# Patient Record
Sex: FEMALE | Race: BLACK OR AFRICAN AMERICAN
[De-identification: names, ages, dates, MRNs, and addresses within clinical notes are randomized per-mention and may not be internally consistent; named-entity substitution may affect disease eponyms.]

---

## 2017-07-28 ENCOUNTER — HOSPITAL ENCOUNTER (OUTPATIENT)
Dept: HOSPITAL 17 - NEPE | Age: 62
Setting detail: OBSERVATION
LOS: 1 days | Discharge: HOME | End: 2017-07-29
Attending: HOSPITALIST | Admitting: HOSPITALIST
Payer: MEDICAID

## 2017-07-28 VITALS
RESPIRATION RATE: 17 BRPM | OXYGEN SATURATION: 99 % | HEART RATE: 81 BPM | DIASTOLIC BLOOD PRESSURE: 75 MMHG | SYSTOLIC BLOOD PRESSURE: 123 MMHG

## 2017-07-28 VITALS
TEMPERATURE: 97.4 F | RESPIRATION RATE: 16 BRPM | DIASTOLIC BLOOD PRESSURE: 72 MMHG | OXYGEN SATURATION: 100 % | HEART RATE: 84 BPM | SYSTOLIC BLOOD PRESSURE: 141 MMHG

## 2017-07-28 VITALS
DIASTOLIC BLOOD PRESSURE: 70 MMHG | RESPIRATION RATE: 16 BRPM | HEART RATE: 79 BPM | OXYGEN SATURATION: 99 % | SYSTOLIC BLOOD PRESSURE: 112 MMHG

## 2017-07-28 VITALS
DIASTOLIC BLOOD PRESSURE: 80 MMHG | SYSTOLIC BLOOD PRESSURE: 136 MMHG | TEMPERATURE: 98.5 F | OXYGEN SATURATION: 100 % | HEART RATE: 88 BPM | RESPIRATION RATE: 22 BRPM

## 2017-07-28 VITALS
HEART RATE: 78 BPM | OXYGEN SATURATION: 100 % | DIASTOLIC BLOOD PRESSURE: 68 MMHG | SYSTOLIC BLOOD PRESSURE: 117 MMHG | TEMPERATURE: 97.8 F | RESPIRATION RATE: 18 BRPM

## 2017-07-28 VITALS — DIASTOLIC BLOOD PRESSURE: 70 MMHG | SYSTOLIC BLOOD PRESSURE: 112 MMHG

## 2017-07-28 VITALS — SYSTOLIC BLOOD PRESSURE: 123 MMHG | HEART RATE: 83 BPM | DIASTOLIC BLOOD PRESSURE: 75 MMHG

## 2017-07-28 VITALS
HEART RATE: 77 BPM | TEMPERATURE: 97.8 F | SYSTOLIC BLOOD PRESSURE: 128 MMHG | OXYGEN SATURATION: 97 % | RESPIRATION RATE: 18 BRPM | DIASTOLIC BLOOD PRESSURE: 77 MMHG

## 2017-07-28 VITALS — WEIGHT: 132.28 LBS | HEIGHT: 67 IN | BODY MASS INDEX: 20.76 KG/M2

## 2017-07-28 DIAGNOSIS — Z87.891: ICD-10-CM

## 2017-07-28 DIAGNOSIS — C88.4: Primary | ICD-10-CM

## 2017-07-28 DIAGNOSIS — B96.81: ICD-10-CM

## 2017-07-28 DIAGNOSIS — M06.9: ICD-10-CM

## 2017-07-28 DIAGNOSIS — E03.9: ICD-10-CM

## 2017-07-28 LAB
ALP SERPL-CCNC: 102 U/L (ref 45–117)
ALT SERPL-CCNC: 22 U/L (ref 10–53)
ANION GAP SERPL CALC-SCNC: 7 MEQ/L (ref 5–15)
APTT BLD: 30.2 SEC (ref 24.3–30.1)
AST SERPL-CCNC: 16 U/L (ref 15–37)
BASOPHILS # BLD AUTO: 0.1 TH/MM3 (ref 0–0.2)
BASOPHILS NFR BLD: 0.9 % (ref 0–2)
BILIRUB SERPL-MCNC: 0.4 MG/DL (ref 0.2–1)
BUN SERPL-MCNC: 15 MG/DL (ref 7–18)
CHLORIDE SERPL-SCNC: 108 MEQ/L (ref 98–107)
COLOR UR: YELLOW
COMMENT (UR): (no result)
CULTURE IF INDICATED: (no result)
EOSINOPHIL # BLD: 0.3 TH/MM3 (ref 0–0.4)
EOSINOPHIL NFR BLD: 5.8 % (ref 0–4)
ERYTHROCYTE [DISTWIDTH] IN BLOOD BY AUTOMATED COUNT: 12.9 % (ref 11.6–17.2)
GFR SERPLBLD BASED ON 1.73 SQ M-ARVRAT: 97 ML/MIN (ref 89–?)
GLUCOSE UR STRIP-MCNC: (no result) MG/DL
HCO3 BLD-SCNC: 25.7 MEQ/L (ref 21–32)
HCT VFR BLD CALC: 36.3 % (ref 35–46)
HEMO FLAGS: (no result)
HGB UR QL STRIP: (no result)
INR PPP: 0.9 RATIO
KETONES UR STRIP-MCNC: (no result) MG/DL
LYMPHOCYTES # BLD AUTO: 2.3 TH/MM3 (ref 1–4.8)
LYMPHOCYTES NFR BLD AUTO: 37.4 % (ref 9–44)
MCH RBC QN AUTO: 29.1 PG (ref 27–34)
MCHC RBC AUTO-ENTMCNC: 33.6 % (ref 32–36)
MCV RBC AUTO: 86.9 FL (ref 80–100)
MONOCYTES NFR BLD: 7 % (ref 0–8)
MUCOUS THREADS #/AREA URNS LPF: (no result) /LPF
NEUTROPHILS # BLD AUTO: 3 TH/MM3 (ref 1.8–7.7)
NEUTROPHILS NFR BLD AUTO: 48.9 % (ref 16–70)
NITRITE UR QL STRIP: (no result)
PLATELET # BLD: 248 TH/MM3 (ref 150–450)
POTASSIUM SERPL-SCNC: 3.8 MEQ/L (ref 3.5–5.1)
PROTHROMBIN TIME: 10.1 SEC (ref 9.8–11.6)
RBC # BLD AUTO: 4.18 MIL/MM3 (ref 4–5.3)
SODIUM SERPL-SCNC: 141 MEQ/L (ref 136–145)
SP GR UR STRIP: 1.01 (ref 1–1.03)
WBC # BLD AUTO: 6.1 TH/MM3 (ref 4–11)

## 2017-07-28 PROCEDURE — 96374 THER/PROPH/DIAG INJ IV PUSH: CPT

## 2017-07-28 PROCEDURE — 85610 PROTHROMBIN TIME: CPT

## 2017-07-28 PROCEDURE — C9113 INJ PANTOPRAZOLE SODIUM, VIA: HCPCS

## 2017-07-28 PROCEDURE — G0378 HOSPITAL OBSERVATION PER HR: HCPCS

## 2017-07-28 PROCEDURE — 93005 ELECTROCARDIOGRAM TRACING: CPT

## 2017-07-28 PROCEDURE — 83690 ASSAY OF LIPASE: CPT

## 2017-07-28 PROCEDURE — 80053 COMPREHEN METABOLIC PANEL: CPT

## 2017-07-28 PROCEDURE — 81001 URINALYSIS AUTO W/SCOPE: CPT

## 2017-07-28 PROCEDURE — 71010: CPT

## 2017-07-28 PROCEDURE — 85730 THROMBOPLASTIN TIME PARTIAL: CPT

## 2017-07-28 PROCEDURE — 85025 COMPLETE CBC W/AUTO DIFF WBC: CPT

## 2017-07-28 PROCEDURE — 99285 EMERGENCY DEPT VISIT HI MDM: CPT

## 2017-07-28 RX ADMIN — Medication SCH ML: at 21:14

## 2017-07-28 NOTE — HHI.HP
__________________________________________________





HPI


Service


[facility]


Primary Care Physician


No Primary Care Physician


Admission Diagnosis


MALT Lymphoma


Diagnoses:  


Chief Complaint:  


Abdominal Pain


Travel History


International Travel<30 Days:  No


Contact w/Intl Traveler <30 Da:  No


Traveled to Known Affected Are:  No


History of Present Illness


Written by Kimberley Schultz, acting as scribe for Dr. Maguire on 17 at 18:

24. 








Patient is a 61-year-old AA female with primary medical history of 

hypothyroidism, rheumatoid arthritis who came into the hospital with complaints 

of abdominal pain.  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 

lymphoma, stomach.  She had an EGD with Botox and biopsy done by Dr. Moore and 

was told to come to the hospital for further evaluation and treatment of MALT 

lymphoma.  Two weeks ago she was at [facility] diagnosed with H. Pylori.

  Came in today for increasing abdominal pain, reports abdominal pain is 

intermittent and has increased in frequency and severity, radiating to the mid 

sternum area, relieved with pain medications.  Patient reports constipation, 

nausea but no vomiting.  She also reports night sweats.  States that she has 

been feeling some stomach pain on and off and has been having stomach problems 

thinking it was only gas for the past several months.  Currently she denies 

fevers, shortness of breath, chest pain, palpitations, dizziness, headache, 

diarrhea, dysuria.





Review of Systems


Except as stated in HPI:  all other systems reviewed are Neg





Past Family Social History


Past Medical History


Hypothyroidism


Rheumatoid arthritis


Past Surgical History


Partial thyroidectomy


Spinal tap


Left oophorectomy


Reported Medications





Reported Meds & Active Scripts


Active


Reported


Levothyroxine (Levothyroxine Sodium) 25 Mcg Tab 25 Mcg PO DAILY


Allergies:  


Coded Allergies:  


     Aspirin (Verified  Allergy, Severe, NAUSEA, 17)


     Voltaren (Verified  Allergy, Severe, VOMITING, 17)


     Penicillin (Verified  Adverse Reaction, Severe, VOMITING, 17)


Active Ordered Medications





 Current Medications








 Medications


  (Trade)  Dose


 Ordered  Sig/Deepika


 Route  Start Time


 Stop Time Status Last Admin


 


  (NS Flush)  2 ml  UNSCH  PRN


 IV FLUSH  17 15:30


    17 16:07


 


 


  (NS Flush)  2 ml  UNSCH  PRN


 IV FLUSH  17 17:45


     


 


 


  (NS Flush)  2 ml  BID


 IV FLUSH  17 21:00


     


 








Family History


Grandmother  of colon cancer, one brother had leukemia, sister had  

from heart attack, another brother has stomach and colon problem is still alive


Social History


Rare alcohol use not in the last 3 months


Former smoker, quit 10 years ago


History of illicit drug use but not IV drugs in her 30s, she has been clean for 

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





Physical Exam


Vital Signs





 Vital Signs








  Date Time  Temp Pulse Resp B/P Pulse Ox O2 Delivery O2 Flow Rate FiO2


 


17 18:15    112/70 98   


 


17 17:34  79 16 112/70 99 Room Air  


 


17 17:15  83  123/75    


 


17 15:54  81 17 123/75 99 Room Air  


 


17 12:48 98.5 88 22 136/80 100 Room Air  








Physical Exam


GENERAL: This is a well-nourished, well-developed patient, in no apparent 

distress.


SKIN: No rashes, ecchymoses or lesions. Warm and dry.


HEAD: Normocephalic. No temporal or scalp tenderness.


EYES: Pupils equal round and reactive. Extraocular motions intact. No scleral 

icterus. No injection or drainage. 


ENT: Nose without bleeding. Throat without erythema. Uvula midline.  Dentures 

present.  Airway patent.


NECK: Trachea midline. No JVD or lymphadenopathy. Supple, nontender, no 

meningeal signs.


CARDIOVASCULAR: Regular rate and rhythm without murmurs, gallops, or rubs. 


RESPIRATORY: Clear to auscultation. Breath sounds equal bilaterally. No wheezes

, rales, or rhonchi.  


GASTROINTESTINAL: Abdomen soft, non-tender, slightly distended. BS Active x 4


MUSCULOSKELETAL: Extremities without clubbing, cyanosis, or edema. 


NEUROLOGICAL: Awake and alert. Cranial nerves II through XII intact.  Motor and 

sensory grossly within normal limits. Normal speech.


Laboratory





Laboratory Tests








Test 17





 15:40


 


White Blood Count 6.1 


 


Red Blood Count 4.18 


 


Hemoglobin 12.2 


 


Hematocrit 36.3 


 


Mean Corpuscular Volume 86.9 


 


Mean Corpuscular Hemoglobin 29.1 


 


Mean Corpuscular Hemoglobin 33.6 





Concent 


 


Red Cell Distribution Width 12.9 


 


Platelet Count 248 


 


Mean Platelet Volume 8.2 


 


Neutrophils (%) (Auto) 48.9 


 


Lymphocytes (%) (Auto) 37.4 


 


Monocytes (%) (Auto) 7.0 


 


Eosinophils (%) (Auto) 5.8 


 


Basophils (%) (Auto) 0.9 


 


Neutrophils # (Auto) 3.0 


 


Lymphocytes # (Auto) 2.3 


 


Monocytes # (Auto) 0.4 


 


Eosinophils # (Auto) 0.3 


 


Basophils # (Auto) 0.1 


 


CBC Comment DIFF FINAL 


 


Differential Comment  


 


Prothrombin Time 10.1 


 


Prothromb Time International 0.9 





Ratio 


 


Activated Partial 30.2 





Thromboplast Time 


 


Urine Color YELLOW 


 


Urine Turbidity CLEAR 


 


Urine pH 5.5 


 


Urine Specific Gravity 1.015 


 


Urine Protein NEG 


 


Urine Glucose (UA) NEG 


 


Urine Ketones NEG 


 


Urine Occult Blood NEG 


 


Urine Nitrite NEG 


 


Urine Bilirubin NEG 


 


Urine Urobilinogen LESS THAN 2.0 


 


Urine Leukocyte Esterase NEG 


 


Urine WBC 1 


 


Urine Mucus FEW 


 


Microscopic Urinalysis Comment CULT NOT





 INDICATED


 


Sodium Level 141 


 


Potassium Level 3.8 


 


Chloride Level 108 


 


Carbon Dioxide Level 25.7 


 


Anion Gap 7 


 


Blood Urea Nitrogen 15 


 


Creatinine 0.74 


 


Estimat Glomerular Filtration 97 





Rate 


 


Random Glucose 95 


 


Calcium Level 9.2 


 


Total Bilirubin 0.4 


 


Aspartate Amino Transf 16 





(AST/SGOT) 


 


Alanine Aminotransferase 22 





(ALT/SGPT) 


 


Alkaline Phosphatase 102 


 


Total Protein 7.2 


 


Albumin 3.4 


 


Lipase 170 








Result Diagram:  


17 1540                                                                   

             17 1540








Assessment and Plan


Problem List:  


(1) MALT lymphoma


ICD Code:  C88.4


Status:  Acute


(2) Hypothyroidism


ICD Code:  E03.9


Status:  Chronic


Assessment and Plan


Patient is a 61-year-old AA female with primary medical history of 

hypothyroidism, rheumatoid arthritis who came into the hospital with complaints 

of abdominal pain.  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 

lymphoma, stomach.  





MALT lymphoma, gastric


Abdominal pain, associated with nausea


   - Dx with H. Pylori 2 weeks ago.  Had EGD with botox and biopsy done by Dr. Moore.


   - Medical Oncology consult for further recommendations


   - GI consult for follow up.  She was seen by Dr. Moore previously.


   - Zofran PRN for nausea


   - Protonix





Hypothyroidism


   - Continue with home medication levothyroxine 25 g daily





 Labs reviewed UA negative, slightly elevated eosinophil 5.8, slightly elevated 

chloride 108, APTT 30.2, otherwise the rest of the lab readings are within 

normal





DVT prop SCD


Code Status


Full code


Discussed Condition With


Patient, nursing, ED Attending





Attending Statement


This note was transcribed by tip Schultz.  I, Dr. Everett Maguire personally performed the history, physical exam, and medical decision 

making; and confirmed the accuracy of the information in the transcribed note.





Authenticated by Dr. Everett Maguire on 17 at 19:02.








Kimberley Regan 2017 18:40


Everett Maguire MD 2017 19:03

## 2017-07-28 NOTE — RADRPT
EXAM DATE/TIME:  07/28/2017 15:58 

 

[facility] COMPARISON:     

No previous studies available for comparison.

 

                     

INDICATIONS :     

Upper abdominal/chest pain. 

                     

 

MEDICAL HISTORY :     

None.          

 

SURGICAL HISTORY :     

None.   

 

ENCOUNTER:     

Initial                                        

 

ACUITY:     

2 weeks      

 

PAIN SCORE:     

6/10

LOCATION:      chest 

 

FINDINGS:     

The lungs are clear without infiltrate, nodule, or mass.  There is no appreciable pleural effusion fo
r technique.  Heart and mediastinum are unremarkable.

 

CONCLUSION:         No acute cardiopulmonary disease.

 

 

 STUART Vieyra MD on July 28, 2017 at 16:31           

Board Certified Radiologist.

 This report was verified electronically.

## 2017-07-28 NOTE — PD
HPI


Chief Complaint:  Abdominal Pain


Time Seen by Provider:  15:05


Travel History


International Travel<30 days:  No


Contact w/Intl Traveler<30days:  No


Traveled to known affect area:  No





History of Present Illness


HPI


61-year-old Afro-American female presents the emergency department reportedly 

at the request of Dr. Moore the gastroenterologist for recent diagnosis of 

stomach/esophageal cancer.  Patient states she recently had an endoscopy for 

ongoing chest discomfort and abdominal pain.  She states she was called by Dr. Moore's office to come in the hospital for further evaluation and treatment for 

new diagnosis of MALT lymphoma.  Patient currently has minimal pain but has a 

sickly's stomach feeling at this time.  No vomiting currently.  She has no 

diarrhea, no constipation.  She is allergic to aspirin, penicillin, and 

Voltaren.





PFSH


Past Medical History


Arthritis:  Yes


Asthma:  No


Autoimmune Disease:  No


Blood Disorders:  No


Anxiety:  No


Depression:  No


Heart Rhythm Problems:  No


Cancer:  No


Cardiovascular Problems:  Yes


High Cholesterol:  No


Chemotherapy:  No


Chest Pain:  Yes


Congestive Heart Failure:  No


COPD:  No


Cerebrovascular Accident:  No


Diabetes:  No


Diminished Hearing:  No


Endocrine:  No


Gastrointestinal Disorders:  No


GERD:  No


Glaucoma:  No


Genitourinary:  No


Headaches:  No


Hepatitis:  No


Hiatal Hernia:  No


Hypertension:  No


Immune Disorder:  No


Kidney Stones:  No


Musculoskeletal:  Yes


Neurologic:  No


Psychiatric:  No


Respiratory:  No


Myocardial Infarction:  No


Radiation Therapy:  No


Renal Failure:  No


Seizures:  No


Sickle Cell Disease:  No


Sleep Apnea:  No


Thyroid Disease:  No


Ulcer:  No


Menopausal:  Yes





Past Surgical History


Abdominal Surgery:  No


AICD:  No


Cardiac Surgery:  No


Ear Surgery:  No


Endocrine Surgery:  No


Eye Surgery:  No


Genitourinary Surgery:  No


Gynecologic Surgery:  Yes (LEFT OOPHRECTOMY)


Hysterectomy:  Yes (STATES ONE OVARY REMOVED ONLY)


Joint Replacement:  No


Neurologic Surgery:  No


Oral Surgery:  No


Pacemaker:  No


Thoracic Surgery:  No


Other Surgery:  Yes





Social History


Alcohol Use:  No


Tobacco Use:  No


Substance Use:  No





Allergies-Medications


(Allergen,Severity, Reaction):  


Coded Allergies:  


     Aspirin (Verified  Allergy, Severe, NAUSEA, 7/28/17)


     Voltaren (Verified  Allergy, Severe, VOMITING, 7/28/17)


     Penicillin (Verified  Adverse Reaction, Severe, VOMITING, 7/28/17)


Reported Meds & Prescriptions





Reported Meds & Active Scripts


Active


Reported


Levothyroxine (Levothyroxine Sodium) 25 Mcg Tab 25 Mcg PO DAILY








Review of Systems


General / Constitutional:  No: Fever


Eyes:  No: Visual changes


HENT:  No: Headaches


Cardiovascular:  No: Chest Pain or Discomfort


Respiratory:  No: Shortness of Breath


Gastrointestinal:  Positive: Nausea,  No: Vomiting, Diarrhea, Abdominal Pain


Genitourinary:  No: Dysuria


Musculoskeletal:  No: Pain


Skin:  No Rash


Neurologic:  No: Weakness


Psychiatric:  No: Depression


Endocrine:  No: Polydipsia


Hematologic/Lymphatic:  No: Easy Bruising





Physical Exam


Narrative


GENERAL: Patient appears in no acute distress.  


SKIN: Warm and dry.  Normal color.  Normal turgor.


HEAD: Atraumatic. Normocephalic. 


EYES: Pupils equal and round. No scleral icterus. No injection or drainage. 


ENT: No nasal bleeding or discharge.  Mucous membranes pink and moist.  Pharynx 

is clear.  Airway is patent.


NECK: Trachea midline. No JVD. 


CARDIOVASCULAR: Regular rate and rhythm.  


RESPIRATORY: No accessory muscle use. Clear to auscultation. Breath sounds 

equal bilaterally. 


GASTROINTESTINAL: Abdomen soft, non-tender, nondistended. Hepatic and splenic 

margins not palpable. 


MUSCULOSKELETAL: Extremities without clubbing, cyanosis, or edema. No obvious 

deformities. 


NEUROLOGICAL: Awake and alert. No obvious cranial nerve deficits.  Motor 

grossly within normal limits. Five out of 5 muscle strength in the arms and 

legs.  Normal speech.


PSYCHIATRIC: Appropriate mood and affect; insight and judgment normal.





Data


Data


Last Documented VS





Vital Signs








  Date Time  Temp Pulse Resp B/P Pulse Ox O2 Delivery O2 Flow Rate FiO2


 


7/28/17 15:54  81 17 123/75 99 Room Air  


 


7/28/17 12:48 98.5       








Orders





 Complete Blood Count With Diff (7/28/17 15:25)


Comprehensive Metabolic Panel (7/28/17 15:25)


Lipase (7/28/17 15:25)


Prothrombin Time / Inr (Pt) (7/28/17 15:25)


Act Partial Throm Time (Ptt) (7/28/17 15:25)


Urinalysis - C+S If Indicated (7/28/17 15:25)


Iv Access Insert/Monitor (7/28/17 15:25)


Ecg Monitoring (7/28/17 15:25)


Oximetry (7/28/17 15:25)


Ondansetron Inj (Zofran Inj) (7/28/17 15:30)


Sodium Chloride 0.9% Flush (Ns Flush) (7/28/17 15:30)


Electrocardiogram (7/28/17 15:25)


Chest, Single Ap (7/28/17 15:25)





Labs





 Laboratory Tests








Test 7/28/17





 15:40


 


White Blood Count 6.1 TH/MM3


 


Red Blood Count 4.18 MIL/MM3


 


Hemoglobin 12.2 GM/DL


 


Hematocrit 36.3 %


 


Mean Corpuscular Volume 86.9 FL


 


Mean Corpuscular Hemoglobin 29.1 PG


 


Mean Corpuscular Hemoglobin 33.6 %





Concent 


 


Red Cell Distribution Width 12.9 %


 


Platelet Count 248 TH/MM3


 


Mean Platelet Volume 8.2 FL


 


Neutrophils (%) (Auto) 48.9 %


 


Lymphocytes (%) (Auto) 37.4 %


 


Monocytes (%) (Auto) 7.0 %


 


Eosinophils (%) (Auto) 5.8 %


 


Basophils (%) (Auto) 0.9 %


 


Neutrophils # (Auto) 3.0 TH/MM3


 


Lymphocytes # (Auto) 2.3 TH/MM3


 


Monocytes # (Auto) 0.4 TH/MM3


 


Eosinophils # (Auto) 0.3 TH/MM3


 


Basophils # (Auto) 0.1 TH/MM3


 


CBC Comment DIFF FINAL 


 


Differential Comment  


 


Prothrombin Time 10.1 SEC


 


Prothromb Time International 0.9 RATIO





Ratio 


 


Activated Partial 30.2 SEC





Thromboplast Time 


 


Urine Color YELLOW 


 


Urine Turbidity CLEAR 


 


Urine pH 5.5 


 


Urine Specific Gravity 1.015 


 


Urine Protein NEG mg/dL


 


Urine Glucose (UA) NEG mg/dL


 


Urine Ketones NEG mg/dL


 


Urine Occult Blood NEG 


 


Urine Nitrite NEG 


 


Urine Bilirubin NEG 


 


Urine Urobilinogen LESS THAN 2.0





 MG/DL


 


Urine Leukocyte Esterase NEG 


 


Urine WBC 1 /hpf


 


Urine Mucus FEW /lpf


 


Microscopic Urinalysis Comment CULT NOT





 INDICATED


 


Sodium Level 141 MEQ/L


 


Potassium Level 3.8 MEQ/L


 


Chloride Level 108 MEQ/L


 


Carbon Dioxide Level 25.7 MEQ/L


 


Anion Gap 7 MEQ/L


 


Blood Urea Nitrogen 15 MG/DL


 


Creatinine 0.74 MG/DL


 


Estimat Glomerular Filtration 97 ML/MIN





Rate 


 


Random Glucose 95 MG/DL


 


Calcium Level 9.2 MG/DL


 


Total Bilirubin 0.4 MG/DL


 


Aspartate Amino Transf 16 U/L





(AST/SGOT) 


 


Alanine Aminotransferase 22 U/L





(ALT/SGPT) 


 


Alkaline Phosphatase 102 U/L


 


Total Protein 7.2 GM/DL


 


Albumin 3.4 GM/DL


 


Lipase 170 U/L











[facility]


Medical Decision Making


Medical Screen Exam Complete:  Yes


Emergency Medical Condition:  Yes


Medical Record Reviewed:  Yes


Differential Diagnosis


Recent diagnosis of abdominal cancer.  H. pylori.  Abdominal pain.


Narrative Course


Call was placed to Dr. Moore, regarding the patient.


IV access is obtained and the patient is given 4 mg Zofran IV.


EKG, chest x-ray, and labs ordered including CBC, CMP, urinalysis, and lipase.


Chest x-ray is negative for acute process.  EKG shows normal sinus rhythm 

without significant findings.


CBC is unremarkable.


Urinalysis is unremarkable.


CMP shows no significant findings.


Records are requested from the [facility].


Patient will be admitted to the hospitalist with oncology consult.





Diagnosis





 Primary Impression:  


 MALT lymphoma





Admitting Information


Admitting Physician Requests:  Observation


Condition:  Stable








Ayo Chavez Jul 28, 2017 15:05

## 2017-07-29 VITALS
TEMPERATURE: 85.5 F | DIASTOLIC BLOOD PRESSURE: 63 MMHG | OXYGEN SATURATION: 98 % | HEART RATE: 89 BPM | SYSTOLIC BLOOD PRESSURE: 128 MMHG

## 2017-07-29 VITALS
OXYGEN SATURATION: 96 % | HEART RATE: 88 BPM | RESPIRATION RATE: 18 BRPM | DIASTOLIC BLOOD PRESSURE: 70 MMHG | TEMPERATURE: 98 F | SYSTOLIC BLOOD PRESSURE: 120 MMHG

## 2017-07-29 VITALS
OXYGEN SATURATION: 99 % | DIASTOLIC BLOOD PRESSURE: 69 MMHG | TEMPERATURE: 96.1 F | HEART RATE: 71 BPM | SYSTOLIC BLOOD PRESSURE: 128 MMHG

## 2017-07-29 VITALS
DIASTOLIC BLOOD PRESSURE: 74 MMHG | TEMPERATURE: 98.4 F | OXYGEN SATURATION: 98 % | SYSTOLIC BLOOD PRESSURE: 129 MMHG | HEART RATE: 80 BPM

## 2017-07-29 RX ADMIN — Medication SCH ML: at 09:00

## 2017-07-29 NOTE — EKG
Date Performed: 2017       Time Performed: 16:15:58

 

PTAGE:      61 years

 

EKG:      Sinus rhythm 

 

 NONSPECIFIC T-WAVE CHANGE ANTERIORLY ABNORMAL ECG Compared to 

 

 PREVIOUS TRACING            , the T-wave changes have improved. PREVIOUS TRACIN2006 07.09

 

DOCTOR:   Chris Magaña  Interpretating Date/Time  2017 19:18:28

## 2017-07-29 NOTE — HHI.DCPOC
Discharge Care Plan


Diagnosis:  


(1) Hypothyroidism


(2) MALT lymphoma


(3) Abdominal pain


(4) H. pylori infection


Goals to Promote Your Health


* To prevent worsening of your condition and complications


* To maintain your health at the optimal level


Directions to Meet Your Goals


*** Take your medications as prescribed


*** Follow your dietary instruction


*** Follow activity as directed








*** Keep your appointments as scheduled


*** Take your immunizations and boosters as scheduled


*** If your symptoms worsen call your PCP, if no PCP go to Urgent Care Center 

or Emergency Room***


*** Smoking is Dangerous to Your Health. Avoid second hand smoke***


***Call the 24-hour hour crisis hotline for domestic abuse at 1-912.690.6104***








Everett Maguire MD Jul 29, 2017 14:57

## 2017-07-29 NOTE — MB
cc:

ROLAN CANTRELL

****

 

 

DATE OF CONSULTATION:  07/29/2017

 

REASON FOR CONSULTATION:

Gastric MALT lymphoma.

 

PATIENT PROFILE:

The patient is a 61-year-old black female.  She has been  twice.  She

has one son.  She was born in [place].  She stopped smoking 10 years ago 
and

had smoked one to two packs of cigarettes per day for 30 years.  She has not

had any alcohol in a least 4 months, and prior to this alcohol intake was very

modest.

 

HISTORY OF PRESENT ILLNESS:

I believe that the current problem dates back to April 30, 2004 when she had an

upper endoscopy.  She had a biopsy of the gastric antrum and was found to have

a lymphoid infiltrate.  A Maryann stain revealed focal clusters of organisms

consistent with Helicobacter. The appearance of the infiltrate would be

consistent with an extranodal marginal zone B-cell MALT lymphoma.

 

Over a number of months, she has had upper abdominal discomfort occasionally 
pain in

the lower chest. Approximately two weeks ago she went to [facility] due to the pain.  She was seen by Dr. Moore, who is a

gastroenterologist.  I have spoken with the nurse practitioner, Isidro Calderon.

 

The patient had a CT scan of the abdomen and pelvis at [facility] and

according to the nurse practitioner this was normal.

She underwent an upper endoscopy performed by Dr. Moore as well as a

colonoscopy on 7/13/2017.  She was found to have duodenitis in the duodenal

bulb.  There are multiple superficial ulcerations and severe gastritis in the

antrum.  There was also a clean based ulcer seen which is suspicious for

malignancy in the lesser curvature.  The EG junction was dilated.  Botox was

injected.  The biopsy revealed Helicobacter pylori. There was moderate to

severe chronic active gastritis.  There was an atypical lymphoid proliferation

suspicious for a MALT lymphoma.

 

She was begun on treatment only one or two days ago.  She came to the hospital

because of upper abdominal pain which has occurred again and is intermittent.

There is no bleeding.  She has not had any peripheral adenopathy.  There has

been no fever, night sweats or chills.

 

PAST SURGICAL HISTORY:

Removal of ovary approximately 35 years ago, which was benign.

 

PAST MEDICAL HISTORY:

Overactive thyroid.

 

MEDICATIONS PRIOR TO ADMISSION:

1. Flexeril.

2. Neurontin.

3. Levothyroxine.

4. Recently started on antibiotics, which she has taken for only one to two

   days.

5. I believe, Flagyl.

6. Tetracycline.

7. Protonix.

 

ALLERGIES:

PENICILLIN CAUSES HER STOMACH TO BE UPSET.

 

FAMILY HISTORY:

Family history is noncontributory.

 

REVIEW OF SYSTEMS:

No change in vision or hearing. Recent discomfort in the lower chest area which

was evaluated at [facility]. Upper abdominal pain. In the past, she has

had joint pain and this has been a very minimal problem.  She states that she

was diagnosed many years ago with rheumatoid arthritis and saw Dr. Loaiza. No

dysuria, frequency, hematuria.  No melena or hematochezia. No skin problems.

No psychiatric problems.

 

PHYSICAL EXAMINATION:

GENERAL:  The physical exam reveals a well-appearing female.

VITAL SIGNS: Blood pressure 120/70, respiratory rate 18, pulse 80 afebrile, 02

saturation 98%.

HEAD, EYES, EARS, NOSE, THROAT:  Head is normocephalic. The sclerae and

conjunctivae are normal. Oropharynx unremarkable.

LYMPHATIC: No cervical, supraclavicular, axillary or inguinal adenopathy.

BREASTS: Without masses.

ABDOMEN: Without hepatosplenomegaly or masses.

EXTREMITIES: Without edema.

MUSCULOSKELETAL: No bone pain.

NEUROLOGIC: No weakness.

 

 

ASSESSMENT:

The patient is a 61-year-old female who has gastritis and an ulcer. The biopsy

is suggestive of a MALT lymphoma. According to the nurse practitioner, she had

a CT scan of the abdomen and pelvis, and was found to have no intraabdominal

adenopathy.



Under these circumstances, it appears that she has early stage Helicobacter

pylori-positive lymphoma.  The initial treatment is eradication of the H.

Pylori.

 

RECOMMENDATIONS:

1. The patient should receive treatment to eradicate the H. Pylori.

2. Histologic complete response is achieved in-between 50% to 83% of patients

   treated in this manner.

3. The median time to eradication to CR is about 15 months. Relapse rates are

   about 20%.

4. She will require follow up with GI, both in terms of demonstrating

   eradication of H. Pylori and the need for periodic upper endoscopy with

   biopsies.

5. If the treatment is unsuccessful, then radiation therapy can be used with

   complete response rates of almost 100% but the initial treatment remains

   eradication of the H. Pylori.

 

This was discussed with the nurse practitioner who will talk with the

gastroenterologist on call.  I spoke with the primary care physician, Dr. Maguire, as well.  Hopefully she will be able to go home on appropriate

antibiotic therapy and follow up with gastroenterology in terms of eradication

of the H. pylori and repeat upper endoscopies.

 

 

                              _________________________________

                              MD GENESIS Acosta/ERIN

D:  7/29/2017/2:55 PM

T:  7/29/2017/4:10 PM

Visit #:  [id number]

Job #:  [id number]

JUSTINA

## 2017-07-29 NOTE — HHI.PR
Subjective


Remarks


Follow up abdominal pain, lymphoma. Patient continues to report abdominal pain, 

worse in right upper quadrant. No nausea/vomiting.





Objective


Vitals





 Vital Signs








  Date Time  Temp Pulse Resp B/P Pulse Ox O2 Delivery O2 Flow Rate FiO2


 


7/29/17 08:48 96.1 71  128/69 99   


 


7/29/17 04:24 98.0 88 18 120/70 96   


 


7/28/17 23:17 97.8 78 18 117/68 100   


 


7/28/17 20:01 97.8 77 18 128/77 97   


 


7/28/17 18:46 97.4 84 16 141/72 100   


 


7/28/17 18:15    112/70 98   


 


7/28/17 17:34  79 16 112/70 99 Room Air  


 


7/28/17 17:15  83  123/75    


 


7/28/17 15:54  81 17 123/75 99 Room Air  


 


7/28/17 12:48 98.5 88 22 136/80 100 Room Air  








Result Diagram:  


7/28/17 1540                                                                   

             7/28/17 1540





Imaging





Last Impressions








Chest X-Ray 7/28/17 1525 Signed





Impressions: 





 Service Date/Time:  Friday, July 28, 2017 15:58 - CONCLUSION: No acute 





 cardiopulmonary disease.    STUART Vieyra MD 








Objective Remarks


General: No acute distress.


Heart: Regular rate and rhythm. No murmur.


Lungs: Clear to auscultation bilaterally. No wheezes, rales, or rhonchi. 

Breathing is nonlabored.


Abdomen: Soft, mild tenderness to palpation in the right upper quadrant, 

nondistended.


Extremities: No lower extremity edema.


Psych: Alert and oriented.


Procedures


None


Urinary Catheter:  No


Vascular Central Line Catheter:  No





A/P


Problem List:  


(1) MALT lymphoma


ICD Code:  C88.4


Status:  Acute


(2) Hypothyroidism


ICD Code:  E03.9


Status:  Chronic


(3) Abdominal pain


ICD Code:  R10.9


Status:  Acute


Assessment and Plan


1. MALT lymphoma, gastric: GI and oncology consultations are pending. Continue 

pain control, antiemetics. Continue Protonix.


2. Hypothyroidism: Continue Synthroid.


3. DVT prophylaxis: SCDs, Lovenox.








Everett Maguire MD Jul 29, 2017 10:35

## 2017-07-29 NOTE — PD.CONS
HPI


History of Present Illness


This is a 61 year old AA female with primary medical history of hypothyroidism, 

rheumatoid arthritis who came into the hospital with complaints of abdominal 

pain. Patient has been having epigastric pain and chest pain for 2 yrs, and 

always was told it was GERD, so she would take PPI, that normally would ease 

the symptoms but doesn't totally give her relief. She lost some wt, but has 

gained back recently. Denies nausea, vomiting, hematemesis, melena or 

hematochezia. She has bloating, and constipation. She was evaluated at  2 

weeks ago and under went  EGD/colonoscopy on ( 17) with Dr. Moore---> 

duodenitis in duodenal bulb, multiple superficial ulcerations and severe 

gastritis  in the antrum.. similar findings in the lesser curvature, with clean 

based ulcer was seen. suspicious for malignancy, esophagitis in the distal 

esophagus, a very tight EG junction with dilated esophagus suggesting 

achalasia. Botox was injected, prior to that dilatation suing Savary dilator  17

, small hiatal hernia  Colonoscopy showed diverticulosis, sigmoid and 

descending colon, internal  and external  hemorrhoids, rectal polyps and 2 

diminutive colon cold bx with compete removal was performed BX revealed H-pylori

, gastric body  bx revealed moderate to severe chronic active gastritis with 

atypical lymphoid proliferation suspicious for MALT lymphoma. CD 20: Dominant 

papulation of B cells with focal involvement  of glandular epithelium, CD5: 

Positive in native T-cells, Bc12: positive in the native T-cells, CD 10: 

negative, CD 23: Negative, hyperplastic polyps in the rectum. Ct on 17 

Punctate nonobstructing stone in the upper pole the right kidney, mild to 

moderate circumferential thickening of the gastroesophageal junction. She was 

abx for H-pylori, started first dose yesterday. She is still with epigastric 

pain and asking about pain meds. She was advised by dr. Moore to come here for 

further eval. Oncology is consulted. 














 (Yumiko Quintana)





PFSH


Past Medical History


Hypothyroidism


Rheumatoid arthritis


MALT lymphoma 


GERD


Past Surgical History


Partial thyroidectomy


Spinal tap


Left oophorectomy


EGD/Botox. colonoscopy  (Yumiko Quintana)


Coded Allergies:  


     Aspirin (Verified  Allergy, Severe, NAUSEA, 17)


     Voltaren (Verified  Allergy, Severe, VOMITING, 17)


     Penicillin (Verified  Adverse Reaction, Severe, VOMITING, 17)


Medications





 Current Medications








 Medications


  (Trade)  Dose


 Ordered  Sig/Deepika


 Route  Start Time


 Stop Time Status Last Admin


 


  (NS Flush)  2 ml  UNSCH  PRN


 IV FLUSH  17 15:30


    17 16:07


 


 


  (NS Flush)  2 ml  UNSCH  PRN


 IV FLUSH  17 17:45


     


 


 


  (NS Flush)  2 ml  BID


 IV FLUSH  17 21:00


    17 09:00


 


 


  (Protonix Inj)  40 mg  Q24H


 IV PUSH  17 21:00


    17 21:14


 


 


  (Lovenox Inj)  40 mg  Q24H


 SQ  17 10:45


     


 


 


  (Norco  5-325 Mg)  1 tab  Q4H  PRN


 PO  17 10:45


     


 


 


  (Norco  7.5-325


 Mg)  1 tab  Q4H  PRN


 PO  17 10:45


     


 


 


  (Zofran Inj)  4 mg  Q8HR  PRN


 IV PUSH  17 10:45


     


 








Family History


Grandmother  of colon cancer, one brother had leukemia, sister had  

from heart attack, another brother has stomach and colon problem is still alive


Social History


Rare alcohol use not in the last 3 months


Former smoker, quit 10 years ago


History of illicit drug use but not IV drugs in her 30s, she has been clean for 

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





Review of Systems


Constitutional:  COMPLAINS OF: Weight gain, Change in appetite


Endocrine:  DENIES: Polyuria


Eyes:  DENIES: Double Vision


Ears, nose, mouth, throat:  DENIES: Hoarseness


Respiratory:  DENIES: Shortness of breath


Cardiovascular:  DENIES: Lower Extremity Edema


Gastrointestinal:  COMPLAINS OF: Abdominal pain, Constipation, Swelling of 

Abdomen, Heartburn,  DENIES: Black stools, Bloody stools, Diarrhea, Nausea, 

Vomiting, Difficulty Swallowing, Anorexia, Odynophagia, Hematemesis


Genitourinary:  DENIES: Hematuria


Musculoskeletal:  DENIES: Neck pain


Integumentary:  DENIES: Jaundice


Hematologic/lymphatic:  DENIES: Bruising


Immunologic/allergic:  DENIES: Eczema


Neurologic:  DENIES: Abnormal gait


Psychiatric:  DENIES: Anxiety (Yumiko Quintana)





GI Exam


Vitals I&O





 Vital Signs








  Date Time  Temp Pulse Resp B/P Pulse Ox O2 Delivery O2 Flow Rate FiO2


 


17 08:48 96.1 71  128/69 99   


 


17 04:24 98.0 88 18 120/70 96   


 


17 23:17 97.8 78 18 117/68 100   


 


17 20:01 97.8 77 18 128/77 97   


 


17 18:46 97.4 84 16 141/72 100   


 


17 18:15    112/70 98   


 


17 17:34  79 16 112/70 99 Room Air  


 


17 17:15  83  123/75    


 


17 15:54  81 17 123/75 99 Room Air  


 


17 12:48 98.5 88 22 136/80 100 Room Air  








Imaging





Last Impressions








Chest X-Ray 17 1525 Signed





Impressions: 





 Service Date/Time:  2017 15:58 - CONCLUSION: No acute 





 cardiopulmonary disease.    STUART Vieyra MD 





 


Ct on 17 Punctate nonobstructing stone in the upper pole the right kidney, 

mild to moderate circumferential thickening of the gastroesophageal junction


Laboratory











Test 17





 15:40


 


White Blood Count 6.1 TH/MM3


 


Red Blood Count 4.18 MIL/MM3


 


Hemoglobin 12.2 GM/DL


 


Hematocrit 36.3 %


 


Mean Corpuscular Volume 86.9 FL


 


Mean Corpuscular Hemoglobin 29.1 PG


 


Mean Corpuscular Hemoglobin 33.6 %





Concent 


 


Red Cell Distribution Width 12.9 %


 


Platelet Count 248 TH/MM3


 


Mean Platelet Volume 8.2 FL


 


Neutrophils (%) (Auto) 48.9 %


 


Lymphocytes (%) (Auto) 37.4 %


 


Monocytes (%) (Auto) 7.0 %


 


Eosinophils (%) (Auto) 5.8 %


 


Basophils (%) (Auto) 0.9 %


 


Neutrophils # (Auto) 3.0 TH/MM3


 


Lymphocytes # (Auto) 2.3 TH/MM3


 


Monocytes # (Auto) 0.4 TH/MM3


 


Eosinophils # (Auto) 0.3 TH/MM3


 


Basophils # (Auto) 0.1 TH/MM3


 


CBC Comment DIFF FINAL 


 


Differential Comment  


 


Prothrombin Time 10.1 SEC


 


Prothromb Time International 0.9 RATIO





Ratio 


 


Activated Partial 30.2 SEC





Thromboplast Time 


 


Urine Color YELLOW 


 


Urine Turbidity CLEAR 


 


Urine pH 5.5 


 


Urine Specific Gravity 1.015 


 


Urine Protein NEG mg/dL


 


Urine Glucose (UA) NEG mg/dL


 


Urine Ketones NEG mg/dL


 


Urine Occult Blood NEG 


 


Urine Nitrite NEG 


 


Urine Bilirubin NEG 


 


Urine Urobilinogen LESS THAN 2.0





 MG/DL


 


Urine Leukocyte Esterase NEG 


 


Urine WBC 1 /hpf


 


Urine Mucus FEW /lpf


 


Microscopic Urinalysis Comment CULT NOT





 INDICATED


 


Sodium Level 141 MEQ/L


 


Potassium Level 3.8 MEQ/L


 


Chloride Level 108 MEQ/L


 


Carbon Dioxide Level 25.7 MEQ/L


 


Anion Gap 7 MEQ/L


 


Blood Urea Nitrogen 15 MG/DL


 


Creatinine 0.74 MG/DL


 


Estimat Glomerular Filtration 97 ML/MIN





Rate 


 


Random Glucose 95 MG/DL


 


Calcium Level 9.2 MG/DL


 


Total Bilirubin 0.4 MG/DL


 


Aspartate Amino Transf 16 U/L





(AST/SGOT) 


 


Alanine Aminotransferase 22 U/L





(ALT/SGPT) 


 


Alkaline Phosphatase 102 U/L


 


Total Protein 7.2 GM/DL


 


Albumin 3.4 GM/DL


 


Lipase 170 U/L








Physical Examination


HEENT: normocephalic; atraumatic; no jaundice.  


NECK: Neck is supple, no JVD, no lymphadenopathy.


CHEST:  Chest is clear to auscultation and percussion.


CARDIAC:  Regular rate and rhythm with no murmur gallop or rubs.


ABDOMEN:  Soft, nondistended, epigastric pain; no hepatosplenomegaly; bowel 

sounds are present in all four quadrants.


EXTREMITIES: No clubbing, cyanosis, or edema.


SKIN:  Normal; no rash; no jaundice.


CNS:  No focal deficits; alert and oriented times three. (Yumiko Quintana)





Assessment and Plan


Plan


- New diagnosis of MALT lymphoma- Patient has been having epigastric pain and 

chest pain for 2 yrs, and always was told it was GERD, so she would take PPI, 

that normally would ease the symptoms but doesn't totally give her relief. She 

lost some wt, but has gained back recently. Denies nausea, vomiting, hematemesis

, melena or hematochezia. She has bloating, and constipation. She was evaluated 

at  2 weeks ago and under went  EGD/colonoscopy on ( 17) with Dr. Moore--

-> duodenitis in duodenal bulb, multiple superficial ulcerations and severe 

gastritis  in the antrum.. similar findings in the lesser curvature, with clean 

based ulcer was seen. suspicious for malignancy, esophagitis in the distal 

esophagus, a very tight EG junction with dilated esophagus suggesting 

achalasia. Botox was injected, prior to that dilatation suing Savary dilator  17

, small hiatal hernia  Colonoscopy showed diverticulosis, sigmoid and 

descending colon, internal  and external  hemorrhoids, rectal polyps and 2 

diminutive colon cold bx with compete removal was performed BX revealed H-pylori

, gastric body  bx revealed moderate to severe chronic active gastritis with 

atypical lymphoid proliferation suspicious for MALT lymphoma. CD 20: Dominant 

papulation of B cells with focal involvement  of glandular epithelium, CD5: 

Positive in native T-cells, Bc12: positive in the native T-cells, CD 10: 

negative, CD 23: Negative, hyperplastic polyps in the rectum. Ct on 17 

Punctate nonobstructing stone in the upper pole the right kidney, mild to 

moderate circumferential thickening of the gastroesophageal junction. She was 

abx for H-pylori, started first dose yesterday. She is still with epigastric 

pain and asking about pain meds. She was advised by dr. Moore to come here for 

further eval. Oncology is consulted. 


- Dysphagia- S/P EGD/dill/botox with improvement 


- H-pyori- will order tetracycline, Flagyl, ppi 


- Hypothyroidism per attending 





Plan:


- THAI


- Await oncology eval 


- Tetracycline 500 mg Q 6 hrs


- Flagyl 500 mg Q 8 hrs. 


- cont. PPI


- Pain meds 


- Case was discussed with Dr. Lucio who thinks this is caused by H-pylori, and 

seems to be present in  on bx


  He strongly believes once H-pylori eradicated, this would be cured and he 

recommends f/u bx after eradication of H-pylori 


- ok to go home and continue tx for H-pylori which she started yesterday, then f

/u with GI in 2 weeks 


- EGD in 2 months with bx for f/u on H-pylori 


- Patient seen and examined by Dr. Montoya and myself and this note is written 

on his behalf  (Yumiko Quintana)


Physician Comments


Patient seen and examined


Agree with above


Continue with current supportive care


Monitor labs


We will proceed with H. pylori eradication


Patient will need to follow-up with GI in 2 weeks


Most likely she will need repeat endoscopy


Oncology evaluation appreciated


Okay for discharge from a GI standpoint (Angel Montoya MD)








Yumiko Quintana 2017 11:38


Angel Montoya MD 2017 17:20

## 2017-08-24 ENCOUNTER — HOSPITAL ENCOUNTER (OUTPATIENT)
Dept: HOSPITAL 17 - HRAD | Age: 62
End: 2017-08-24
Attending: INTERNAL MEDICINE
Payer: COMMERCIAL

## 2017-08-24 DIAGNOSIS — C88.4: Primary | ICD-10-CM

## 2017-08-24 PROCEDURE — 71260 CT THORAX DX C+: CPT

## 2017-08-24 PROCEDURE — 74177 CT ABD & PELVIS W/CONTRAST: CPT

## 2017-08-24 NOTE — RADRPT
EXAM DATE/TIME:  08/24/2017 13:48 

 

[facility] COMPARISON:     

No previous studies available for comparison.

 

 

INDICATIONS :     

Lymphoma; re-staging evaluation.

                      

 

IV CONTRAST:     

96 cc Omnipaque 350 (iohexol) IV ; Cumulative dose for multiple exams.

 

 

ORAL CONTRAST:      

Prescribed oral contrast ingested.

                      

 

RADIATION DOSE:     

5.13 CTDIvol (mGy) ; Combined studies - Thorax/Abdomen/Pelvis

 

 

MEDICAL HISTORY :     

Lymphoma.  

 

SURGICAL HISTORY :       

Left oophorectomy. 

 

ENCOUNTER:      

Initial

 

ACUITY:      

1 day

 

PAIN SCALE:      

0/10

 

LOCATION:        

upper quadrant 

 

TECHNIQUE:     

Volumetric scanning of the abdomen and pelvis was performed.  Using automated exposure control and ad
justment of the mA and/or kV according to patient size, radiation dose was kept as low as reasonably 
achievable to obtain optimal diagnostic quality images.  DICOM format image data is available electro
nically for review and comparison.  

 

FINDINGS:     

CT Abdomen: The spleen, pancreas, right kidney, adrenals are unremarkable. Incidental note is made of
 a couple of tiny subcentimeter cysts in the left kidney. There is a tiny subcentimeter cyst in the l
eft hepatic lobe found incidentally. There is no evidence for any appreciable pathological adenopathy
, free fluid, or bowel obstruction.  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
ilaterally. Chronic vascular calcifications are present involving the aorta, iliac arteries without a
ny significant stenosis or aneurysmal dilatations for technique.

 

 

CT pelvis: There is no evidence for mass, abscess formation, or any significant adenopathy within the
 pelvis.  

 

 

CONCLUSION:     Chronic atherosclerotic calcifications and no evidence for metastatic disease.

 

 

 

 STUART Vieyra MD on August 24, 2017 at 14:37           

Board Certified Radiologist.

 This report was verified electronically.

## 2017-08-24 NOTE — RADRPT
EXAM DATE/TIME:  08/24/2017 13:45 

 

[facility] COMPARISON:     

No previous studies available for comparison.

 

 

INDICATIONS :     

Lymphoma; re-staging evaluation.

                      

 

IV CONTRAST:     

96 cc Omnipaque 350 (iohexol) IV ; Cumulative dose for multiple exams.

 

 

RADIATION DOSE:     

5.13 CTDIvol (mGy) ; Combined studies - Thorax/Abdomen/Pelvis

 

 

MEDICAL HISTORY :     

Lymphoma.  

 

SURGICAL HISTORY :       

Left oophorectomy. 

 

ENCOUNTER:      

Initial

 

ACUITY:      

1 day

 

PAIN SCALE:      

0/10

 

LOCATION:        

chest 

 

TECHNIQUE:      

Volumetric scanning of the chest was performed.  Using automated exposure control and adjustment of t
he mA and/or kV according to patient size, radiation dose was kept as low as reasonably achievable to
 obtain optimal diagnostic quality images.   DICOM format image data is available electronically for 
review and comparison.  

 

Follow-up recommendations for detected pulmonary nodules are based at a minimum on nodule size and pa
tient risk factors according to Fleischner Society Guidelines.

 

FINDINGS:     

The lungs are clear without infiltrate, nodule, or mass except for slight linear scarring right lung 
base and nodular appearing scar involving the left major fissure.  There is no pleural effusion. Ther
e is oblong shaped density in the anterior mediastinum along the aortic arch measures 4.5 x 1.6 cm in
 size not particularly masslike may be matted lymph nodes at this site most likely benign, but indete
rminate. L1 transverse processes appear not fused on a congenital basis bilaterally.

 

CONCLUSION:     Indeterminate soft tissue density in the anterior mediastinum may be scarred down mat
vishal lymph nodes.

 

 

 STUART Vieyra MD on August 24, 2017 at 14:19           

Board Certified Radiologist.

 This report was verified electronically.

## 2017-09-15 ENCOUNTER — HOSPITAL ENCOUNTER (OUTPATIENT)
Dept: HOSPITAL 17 - CLAB | Age: 62
End: 2017-09-15
Attending: FAMILY MEDICINE
Payer: COMMERCIAL

## 2017-09-15 DIAGNOSIS — A04.8: ICD-10-CM

## 2017-09-15 DIAGNOSIS — E03.9: Primary | ICD-10-CM

## 2017-09-15 DIAGNOSIS — C88.4: ICD-10-CM

## 2017-09-15 DIAGNOSIS — R10.84: ICD-10-CM

## 2017-09-15 LAB
HDLC SERPL-MCNC: 58.3 MG/DL (ref 40–60)
LDLC SERPL-MCNC: 146 MG/DL (ref 0–99)

## 2017-09-15 PROCEDURE — 36415 COLL VENOUS BLD VENIPUNCTURE: CPT

## 2017-09-15 PROCEDURE — 80061 LIPID PANEL: CPT

## 2017-09-15 PROCEDURE — 84443 ASSAY THYROID STIM HORMONE: CPT

## 2017-10-11 ENCOUNTER — HOSPITAL ENCOUNTER (OUTPATIENT)
Dept: HOSPITAL 17 - CLAB | Age: 62
End: 2017-10-11
Attending: INTERNAL MEDICINE
Payer: COMMERCIAL

## 2017-10-11 DIAGNOSIS — K21.9: Primary | ICD-10-CM

## 2017-10-11 DIAGNOSIS — K29.70: ICD-10-CM

## 2017-10-11 LAB
BASOPHILS # BLD AUTO: 0 TH/MM3 (ref 0–0.2)
BASOPHILS NFR BLD: 0.4 % (ref 0–2)
EOSINOPHIL # BLD: 0.2 TH/MM3 (ref 0–0.4)
EOSINOPHIL NFR BLD: 3.2 % (ref 0–4)
ERYTHROCYTE [DISTWIDTH] IN BLOOD BY AUTOMATED COUNT: 13.3 % (ref 11.6–17.2)
FERRITIN SERPL-MCNC: 77 NG/ML (ref 8–252)
HCT VFR BLD CALC: 36.6 % (ref 35–46)
HEMO FLAGS: (no result)
LYMPHOCYTES # BLD AUTO: 2.4 TH/MM3 (ref 1–4.8)
LYMPHOCYTES NFR BLD AUTO: 38.3 % (ref 9–44)
MCH RBC QN AUTO: 28.2 PG (ref 27–34)
MCHC RBC AUTO-ENTMCNC: 32.5 % (ref 32–36)
MCV RBC AUTO: 86.7 FL (ref 80–100)
MONOCYTES NFR BLD: 9.2 % (ref 0–8)
NEUTROPHILS # BLD AUTO: 3.1 TH/MM3 (ref 1.8–7.7)
NEUTROPHILS NFR BLD AUTO: 48.9 % (ref 16–70)
PLATELET # BLD: 233 TH/MM3 (ref 150–450)
RBC # BLD AUTO: 4.23 MIL/MM3 (ref 4–5.3)
TRANSFERRIN IRON PROFILE: 262 MG/DL (ref 200–360)
VIT B12 SERPL-MCNC: 713 PG/ML (ref 193–986)
WBC # BLD AUTO: 6.4 TH/MM3 (ref 4–11)

## 2017-10-11 PROCEDURE — 82728 ASSAY OF FERRITIN: CPT

## 2017-10-11 PROCEDURE — 36415 COLL VENOUS BLD VENIPUNCTURE: CPT

## 2017-10-11 PROCEDURE — 82306 VITAMIN D 25 HYDROXY: CPT

## 2017-10-11 PROCEDURE — 83540 ASSAY OF IRON: CPT

## 2017-10-11 PROCEDURE — 85025 COMPLETE CBC W/AUTO DIFF WBC: CPT

## 2017-10-11 PROCEDURE — 83550 IRON BINDING TEST: CPT

## 2017-10-11 PROCEDURE — 82746 ASSAY OF FOLIC ACID SERUM: CPT

## 2017-10-11 PROCEDURE — 82607 VITAMIN B-12: CPT

## 2017-12-08 ENCOUNTER — HOSPITAL ENCOUNTER (OUTPATIENT)
Dept: HOSPITAL 17 - HEND | Age: 62
End: 2017-12-08
Attending: INTERNAL MEDICINE
Payer: COMMERCIAL

## 2017-12-08 DIAGNOSIS — R13.10: Primary | ICD-10-CM

## 2017-12-08 PROCEDURE — 91010 ESOPHAGUS MOTILITY STUDY: CPT

## 2017-12-13 ENCOUNTER — HOSPITAL ENCOUNTER (OUTPATIENT)
Dept: HOSPITAL 17 - HSDC | Age: 62
End: 2017-12-13
Attending: INTERNAL MEDICINE
Payer: COMMERCIAL

## 2017-12-13 VITALS
HEART RATE: 78 BPM | DIASTOLIC BLOOD PRESSURE: 66 MMHG | RESPIRATION RATE: 16 BRPM | OXYGEN SATURATION: 100 % | TEMPERATURE: 97.6 F | SYSTOLIC BLOOD PRESSURE: 145 MMHG

## 2017-12-13 VITALS
RESPIRATION RATE: 20 BRPM | OXYGEN SATURATION: 98 % | SYSTOLIC BLOOD PRESSURE: 125 MMHG | DIASTOLIC BLOOD PRESSURE: 75 MMHG | HEART RATE: 77 BPM | TEMPERATURE: 98.4 F

## 2017-12-13 DIAGNOSIS — B96.81: ICD-10-CM

## 2017-12-13 DIAGNOSIS — K29.50: ICD-10-CM

## 2017-12-13 DIAGNOSIS — C88.4: ICD-10-CM

## 2017-12-13 DIAGNOSIS — K22.0: Primary | ICD-10-CM

## 2017-12-13 PROCEDURE — 88342 IMHCHEM/IMCYTCHM 1ST ANTB: CPT

## 2017-12-13 PROCEDURE — 43259 EGD US EXAM DUODENUM/JEJUNUM: CPT

## 2017-12-13 PROCEDURE — 43236 UPPR GI SCOPE W/SUBMUC INJ: CPT

## 2017-12-13 PROCEDURE — 00740: CPT

## 2017-12-13 PROCEDURE — 88305 TISSUE EXAM BY PATHOLOGIST: CPT

## 2017-12-13 PROCEDURE — 88341 IMHCHEM/IMCYTCHM EA ADD ANTB: CPT

## 2017-12-13 PROCEDURE — 88312 SPECIAL STAINS GROUP 1: CPT

## 2017-12-13 NOTE — PD.PROCEDR
GI Procedure





PROCEDURE PERFORMED


EGD with Botox injection into the LES with biopsy followed by an endoscopic 

ultrasound





INDICATION FOR PROCEDURE


History of achalasia, history of MALT lymphoma





PROCEDURE:


The procedure, risks and benefits were discussed with Ms. Garcia and informed


consent was obtained.  Anesthesia sedated her with Diprivan.  She was placed in 

the left lateral decubitus position.





EGD:


The Pentax videoscope was introduced through the oropharynx and advanced to the 

second portion of the duodenum under direct visualization. Retroflexion was 

performed in the stomach.





FINDINGS:


The esophagus this appeared to be unremarkable and within normal limits 100 

units of Botox were injected into the LES in 4 quadrants


The stomach the gastric mucosa appeared to be diffusely erythemic and mildly 

nodular with superficial erosions specifically in the body and less so in the 

antrum biopsies were taken from the gastric body and from the antrum


The duodenum this was normal





Endoscopic ultrasound:


The Pentax videoscope was introduced through the oropharynx and advanced to the 

stomach





FINDINGS:


The endoscopic ultrasound appearance of the gastric wall was basically 

unremarkable with no disruptions noted


No lymphadenopathy


Unremarkable pancreatic body and tail with a normal pancreatic duct





ESTIMATED BLOOD LOSS:


None





SPECIMENS REMOVED:


Gastric biopsies





COMPLICATIONS:


None





IMPRESSION:


History of achalasia


History of MALT lymphoma


Pangastritis





PLAN:


Await biopsies


Follow-up in 3 weeks











Angel Montoya MD Dec 13, 2017 12:44

## 2018-01-16 ENCOUNTER — HOSPITAL ENCOUNTER (EMERGENCY)
Dept: HOSPITAL 17 - NEPD | Age: 63
Discharge: HOME | End: 2018-01-16
Payer: COMMERCIAL

## 2018-01-16 VITALS
RESPIRATION RATE: 16 BRPM | TEMPERATURE: 98.5 F | HEART RATE: 79 BPM | SYSTOLIC BLOOD PRESSURE: 176 MMHG | OXYGEN SATURATION: 99 % | DIASTOLIC BLOOD PRESSURE: 77 MMHG

## 2018-01-16 VITALS — BODY MASS INDEX: 25.95 KG/M2 | HEIGHT: 67 IN | WEIGHT: 165.35 LBS

## 2018-01-16 DIAGNOSIS — S63.501A: Primary | ICD-10-CM

## 2018-01-16 DIAGNOSIS — E05.90: ICD-10-CM
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
## 2018-01-16 PROCEDURE — L3908 WHO COCK-UP NONMOLDE PRE OTS: HCPCS

## 2018-01-16 PROCEDURE — 99283 EMERGENCY DEPT VISIT LOW MDM: CPT

## 2018-01-16 PROCEDURE — 73110 X-RAY EXAM OF WRIST: CPT

## 2018-01-16 NOTE — PD
HPI


Chief Complaint:  Injury


Time Seen by Provider:  03:17


Travel History


International Travel<30 days:  No


Contact w/Intl Traveler<30days:  No


Traveled to known affect area:  No





History of Present Illness


HPI


62-year-old female presents to emergency department for evaluation of right 

wrist pain after a misstep and fall last evening.  Patient states that she 

skinned her knee and kind of slid across the floor.  She did not strike her 

head or lose consciousness.  She did land on an outstretched right upper 

extremity.  She's been having right wrist pain ever since.  She states is been 

getting worse.  Rates it a 8 out of 10, constant, throbbing.  Pain is 

exacerbated by movement.  She has no alterations in sensation.  She has no 

other symptoms





PFSH


Past Medical History


Arthritis:  Yes


Asthma:  No


Autoimmune Disease:  No


Blood Disorders:  No


Anxiety:  No


Depression:  No


Heart Rhythm Problems:  No


Cancer:  Yes (MULT LYMPHOMA )


Cardiovascular Problems:  No


High Cholesterol:  No


Chemotherapy:  No


Chest Pain:  No


Congestive Heart Failure:  No


COPD:  No


Cerebrovascular Accident:  No


Diabetes:  No


Diminished Hearing:  No


Endocrine:  No


Gastrointestinal Disorders:  Yes (LYPHOMA, GERD, TROUBLE SWALLOWING)


GERD:  No


Glaucoma:  No


Genitourinary:  No


Headaches:  No


Hepatitis:  No


Hiatal Hernia:  No


Hypertension:  No


Immune Disorder:  No


Kidney Stones:  No


Musculoskeletal:  No


Neurologic:  No


Psychiatric:  No


Reproductive:  No


Respiratory:  No


Myocardial Infarction:  No


Radiation Therapy:  No


Renal Failure:  No


Seizures:  No


Sickle Cell Disease:  No


Sleep Apnea:  No


Thyroid Disease:  Yes (REPORTS HAVING AN OVERACTIVE THYROID.)


Ulcer:  No


Tetanus Vaccination:  Unknown


Influenza Vaccination:  No


Pregnant?:  Not Pregnant


LMP:  menapause


Menopausal:  Yes





Past Surgical History


Abdominal Surgery:  No


AICD:  No


Cardiac Surgery:  No


Ear Surgery:  No


Endocrine Surgery:  No


Eye Surgery:  No


Genitourinary Surgery:  No


Gynecologic Surgery:  Yes (LEFT OOPHRECTOMY)


Hysterectomy:  Yes (STATES ONE OVARY REMOVED ONLY)


Joint Replacement:  No


Neurologic Surgery:  No


Oral Surgery:  No


Pacemaker:  No


Thoracic Surgery:  No


Other Surgery:  Yes (SPINAL TAP TO REMOVE CYST, THYROID SURGERY, REMOVAL  OF 1 

OVARY)





Social History


Alcohol Use:  No


Tobacco Use:  No


Substance Use:  Yes (REPORTS QUIT USING COCAINE IN 1994)





Allergies-Medications


(Allergen,Severity, Reaction):  


Coded Allergies:  


     aspirin (Verified  Allergy, Severe, NAUSEA, 1/16/18)


     diclofenac (Verified  Allergy, Severe, VOMITING, 1/16/18)


     penicillin G (Verified  Adverse Reaction, Severe, VOMITING, 1/16/18)


Reported Meds & Prescriptions





Reported Meds & Active Scripts


Active


Gabapentin 300 Mg Cap 300 Mg PO BID


Pantoprazole (Pantoprazole Sodium) 40 Mg Tab 40 Mg PO DAILY


Levothyroxine (Levothyroxine Sodium) 25 Mcg Tab 25 Mcg PO DAILY 30 Days


Reported


D3 Maximum Strength (Cholecalciferol) 5,000 Unit Cap 5,000 Units PO DAILY








Review of Systems


Except as stated in HPI:  all other systems reviewed are Neg





Physical Exam


Narrative


GENERAL: Well-nourished, well-developed female patient in no acute distress.


SKIN: Focused skin assessment warm/dry.


HEAD: Normocephalic.  Atraumatic


EYES: No scleral icterus. No injection or drainage. 


NECK: Supple, trachea midline. No JVD or lymphadenopathy.


CARDIOVASCULAR: Regular rate and rhythm without murmurs, gallops, or rubs. 


RESPIRATORY: Breath sounds equal bilaterally. No accessory muscle use.


GASTROINTESTINAL: Abdomen soft, non-tender, nondistended. 


EXTREMITY: There is swelling and tenderness of the right distal forearm and 

wrist.  There is no obvious deformity. The skin is intact. Flexion and 

extension of the fingers is normal.  The fingers are warm and well perfused. 

Sensation to light touch is intact in the hand.


MUSCULOSKELETAL: No cyanosis, or edema. 


BACK: Nontender without obvious deformity. No CVA tenderness.





Data


Data


Last Documented VS





Vital Signs








  Date Time  Temp Pulse Resp B/P (MAP) Pulse Ox O2 Delivery O2 Flow Rate FiO2


 


1/16/18 03:08 98.5 79 16 176/77 (110) 99   








Orders





 Orders


Wrist, Complete ([id number]) (1/16/18 )


Acetamin-Hydrocod 325-5 Mg (Norco  5-325 (1/16/18 03:30)








MDM


Medical Decision Making


Medical Screen Exam Complete:  Yes


Emergency Medical Condition:  Yes


Medical Record Reviewed:  Yes


Differential Diagnosis


Fracture versus sprain versus dislocation versus contusion


Narrative Course


62-year-old female presents to emergency department for evaluation right wrist 

pain following a trip and fall.  Patient appears without distress.  The wrist 

is swollen without deformity.  X-ray imaging is complete.  No acute fracture 

seen.  Patient is placed in a Velcro wrist splint.  She is encouraged to follow-

up with primary care provider and return immediately with acute worsening 

symptoms.





Diagnosis





 Primary Impression:  


 Right wrist sprain


 Qualified Codes:  S63.501A - Unspecified sprain of right wrist, initial 

encounter


Referrals:  


Primary Care Physician


Patient Instructions:  General Instructions, Wrist Sprain (ED)





***Additional Instructions:  


Ice and elevation should reduce pain and swelling.


Wear brace for support


Follow-up with a primary care provider


Return immediately with acute worsening symptoms.


***Med/Other Pt SpecificInfo:  Prescription(s) given


Scripts


Ibuprofen (Ibuprofen) 600 Mg Tab


600 MG PO Q8HR Y for PAIN, #30 TAB 0 Refills


   Prov: Ashley Elliott         1/16/18


Disposition:  01 DISCHARGE HOME


Condition:  Stable











Ashley Elliott Jan 16, 2018 03:23

## 2018-01-22 ENCOUNTER — HOSPITAL ENCOUNTER (EMERGENCY)
Dept: HOSPITAL 17 - NEPK | Age: 63
Discharge: HOME | End: 2018-01-22
Payer: COMMERCIAL

## 2018-01-22 VITALS
TEMPERATURE: 99.1 F | SYSTOLIC BLOOD PRESSURE: 157 MMHG | DIASTOLIC BLOOD PRESSURE: 80 MMHG | HEART RATE: 92 BPM | OXYGEN SATURATION: 98 % | RESPIRATION RATE: 14 BRPM

## 2018-01-22 DIAGNOSIS — M54.41: Primary | ICD-10-CM

## 2018-01-22 PROCEDURE — 99284 EMERGENCY DEPT VISIT MOD MDM: CPT

## 2018-01-22 PROCEDURE — 73502 X-RAY EXAM HIP UNI 2-3 VIEWS: CPT

## 2018-01-22 PROCEDURE — 96372 THER/PROPH/DIAG INJ SC/IM: CPT

## 2018-01-22 NOTE — PD
HPI


Chief Complaint:  Musculoskeletal Complaint


Time Seen by Provider:  21:00


Travel History


International Travel<30 days:  No


Contact w/Intl Traveler<30days:  No


Traveled to known affect area:  No





History of Present Illness


HPI


62-year-old female presents to the emergency department for evaluation right 

lower back pain radiating to her buttock and her thigh.  Patient states that 

she fell 2 weeks ago onto her right side.  She states she is not having any 

right hip or back pain then.  She states this has developed of the course of 

the last few days and now is unbearable.  She states it is very difficult to 

walk without pain shooting down her buttock to her thigh.  Pain is an 8 out of 

10.  She denies any new injury.  No saddle paresthesia, loss of bowel or bladder

, or lower extremity weakness.  She has no other symptoms to report.





PFSH


Past Medical History


Arthritis:  Yes


Asthma:  No


Autoimmune Disease:  No


Blood Disorders:  No


Anxiety:  No


Depression:  No


Heart Rhythm Problems:  No


Cancer:  Yes (MULT LYMPHOMA )


Cardiovascular Problems:  No


High Cholesterol:  No


Chemotherapy:  No


Chest Pain:  No


Congestive Heart Failure:  No


COPD:  No


Cerebrovascular Accident:  No


Diabetes:  No


Diminished Hearing:  No


Endocrine:  No


Gastrointestinal Disorders:  Yes (LYPHOMA, GERD, TROUBLE SWALLOWING)


GERD:  No


Glaucoma:  No


Genitourinary:  No


Headaches:  No


Hepatitis:  No


Hiatal Hernia:  No


Hypertension:  No


Immune Disorder:  No


Kidney Stones:  No


Musculoskeletal:  No


Neurologic:  No


Psychiatric:  No


Reproductive:  No


Respiratory:  No


Myocardial Infarction:  No


Radiation Therapy:  No


Renal Failure:  No


Seizures:  No


Sickle Cell Disease:  No


Sleep Apnea:  No


Thyroid Disease:  Yes (REPORTS HAVING AN OVERACTIVE THYROID.)


Ulcer:  No


Menopausal:  Yes





Past Surgical History


Abdominal Surgery:  No


AICD:  No


Cardiac Surgery:  No


Ear Surgery:  No


Endocrine Surgery:  No


Eye Surgery:  No


Genitourinary Surgery:  No


Gynecologic Surgery:  Yes (LEFT OOPHRECTOMY)


Hysterectomy:  Yes (STATES ONE OVARY REMOVED ONLY)


Joint Replacement:  No


Neurologic Surgery:  No


Oral Surgery:  No


Pacemaker:  No


Thoracic Surgery:  No


Other Surgery:  Yes (SPINAL TAP TO REMOVE CYST, THYROID SURGERY, REMOVAL  OF 1 

OVARY)





Social History


Alcohol Use:  No


Tobacco Use:  No


Substance Use:  Yes (REPORTS QUIT USING COCAINE IN 1994)





Allergies-Medications


(Allergen,Severity, Reaction):  


Coded Allergies:  


     aspirin (Verified  Allergy, Severe, NAUSEA, 1/16/18)


     diclofenac (Verified  Allergy, Severe, VOMITING, 1/16/18)


     penicillin G (Verified  Adverse Reaction, Severe, VOMITING, 1/16/18)


Reported Meds & Prescriptions





Reported Meds & Active Scripts


Active


Medrol Dosepak (Methylprednisolone) 4 Mg Dspk 4 Mg PO AS DIRECTED


     Per Pharmacist direction


Ibuprofen 600 Mg Tab 600 Mg PO Q8HR PRN


Gabapentin 300 Mg Cap 300 Mg PO BID


Pantoprazole (Pantoprazole Sodium) 40 Mg Tab 40 Mg PO DAILY


Levothyroxine (Levothyroxine Sodium) 25 Mcg Tab 25 Mcg PO DAILY 30 Days


Reported


D3 Maximum Strength (Cholecalciferol) 5,000 Unit Cap 5,000 Units PO DAILY








Review of Systems


Except as stated in HPI:  all other systems reviewed are Neg





Physical Exam


Narrative


GENERAL: Well-nourished, well-developed female patient, ambulatory and in no 

acute distress


SKIN: Focused skin assessment warm/dry.


HEAD: Normocephalic.


EYES: No scleral icterus. No injection or drainage. 


NECK: Supple, trachea midline. No JVD or lymphadenopathy.


CARDIOVASCULAR: Regular rate and rhythm without murmurs, gallops, or rubs. 


RESPIRATORY: Breath sounds equal bilaterally. No accessory muscle use.


GASTROINTESTINAL: Abdomen soft, non-tender, nondistended. 


MUSCULOSKELETAL: No cyanosis, or edema.  Positive right-sided straight leg.  

Tenderness elicited palpation right sacroiliac joint.  Patient reports pain 

with flexion of the right hip.  Distal pulses are palpable.  Cap refill is 

within normal limits.


BACK: Nontender without obvious deformity. No CVA tenderness.





Data


Data


Last Documented VS





Vital Signs








  Date Time  Temp Pulse Resp B/P (MAP) Pulse Ox O2 Delivery O2 Flow Rate FiO2


 


1/22/18 22:16        


 


1/22/18 18:01 99.1 92 14  98   








Orders





 Orders


Ketorolac Inj (Toradol Inj) (1/22/18 21:15)


Orphenadrine Inj (Norflex Inj) (1/22/18 21:15)


Hip, Uni(Ap&Lat) W Ap Pelvis (1/22/18 )


Ed Discharge Order (1/22/18 22:08)








MDM


Medical Decision Making


Medical Screen Exam Complete:  Yes


Emergency Medical Condition:  Yes


Medical Record Reviewed:  Yes


Differential Diagnosis


Low back pain versus discogenic pain versus sciatica versus hip fracture versus 

sprain versus contusion


Narrative Course


62-year-old female presents to emergency department for evaluation.  Physical 

exam is consistent with sciatica.  X-ray of the pelvis and hip confirms no 

acute bony abnormality.  Patient is treated for pain and counseled on care.  

She is encouraged follow-up with primary care provider and return immediately 

with any acute worsening of symptoms.





Diagnosis





 Primary Impression:  


 Sciatica of right side


Referrals:  


Primary Care Physician


Patient Instructions:  General Instructions, Sciatica (ED)


Departure Forms:  Tests/Procedures, Work Release   Enter return to work date:  

Jan 24, 2018





***Additional Instructions:  


Ice and/or warm moist heat may help to alleviate symptoms


Follow up with a primary care provider


Avoid heavy lifting, bending, and twisting


Return to ED with acute worsening of symptoms


***Med/Other Pt SpecificInfo:  Prescription(s) given


Scripts


Methylprednisolone Dosepak (Medrol Dosepak) 4 Mg Dspk


4 MG PO AS DIRECTED, #1 DSPK 0 Refills


   Per Pharmacist direction


   Prov: Ashley Elliott         1/22/18


Disposition:  01 DISCHARGE HOME


Condition:  Stable











Ashley Elliott Jan 22, 2018 21:07

## 2018-01-22 NOTE — RADRPT
EXAM DATE/TIME:  01/22/2018 21:27 

 

[facility] COMPARISON:     

No previous studies available for comparison.

 

                     

INDICATIONS :     

Right hip pain after fall.

                     

 

MEDICAL HISTORY :     

None.          

 

SURGICAL HISTORY :     

None.   

 

ENCOUNTER:     

Initial                                        

 

ACUITY:     

2 days      

 

PAIN SCORE:     

10/10

 

LOCATION:     

Right posterior hip.

 

FINDINGS:     

Examination of the right hip was performed with AP Pelvis.  The primary and secondary trabecular radha
tanner of the femoral neck is intact.  The hip joint is of normal width without significant sclerosis or
 bony hypertrophy.  The acetabulum is grossly intact.

 

CONCLUSION:     

1. No acute findings.

 

 

 

 Antoine Sharpe MD on January 22, 2018 at 22:01           

Board Certified Radiologist.

 This report was verified electronically.

## 2018-04-14 ENCOUNTER — HOSPITAL ENCOUNTER (EMERGENCY)
Dept: HOSPITAL 17 - NEPE | Age: 63
Discharge: HOME | End: 2018-04-14
Payer: COMMERCIAL

## 2018-04-14 VITALS
OXYGEN SATURATION: 98 % | TEMPERATURE: 97.9 F | HEART RATE: 77 BPM | DIASTOLIC BLOOD PRESSURE: 74 MMHG | RESPIRATION RATE: 18 BRPM | SYSTOLIC BLOOD PRESSURE: 174 MMHG

## 2018-04-14 VITALS
OXYGEN SATURATION: 99 % | RESPIRATION RATE: 16 BRPM | DIASTOLIC BLOOD PRESSURE: 65 MMHG | SYSTOLIC BLOOD PRESSURE: 136 MMHG | HEART RATE: 70 BPM

## 2018-04-14 DIAGNOSIS — M54.42: ICD-10-CM

## 2018-04-14 DIAGNOSIS — C88.4: Primary | ICD-10-CM

## 2018-04-14 DIAGNOSIS — R94.31: ICD-10-CM

## 2018-04-14 DIAGNOSIS — Z79.899: ICD-10-CM

## 2018-04-14 LAB
ALBUMIN SERPL-MCNC: 3.4 GM/DL (ref 3.4–5)
ALP SERPL-CCNC: 110 U/L (ref 45–117)
ALT SERPL-CCNC: 32 U/L (ref 10–53)
AST SERPL-CCNC: 24 U/L (ref 15–37)
BASOPHILS # BLD AUTO: 0 TH/MM3 (ref 0–0.2)
BASOPHILS NFR BLD: 0.7 % (ref 0–2)
BILIRUB SERPL-MCNC: 0.2 MG/DL (ref 0.2–1)
BUN SERPL-MCNC: 11 MG/DL (ref 7–18)
CALCIUM SERPL-MCNC: 8.8 MG/DL (ref 8.5–10.1)
CHLORIDE SERPL-SCNC: 110 MEQ/L (ref 98–107)
COLOR UR: YELLOW
CREAT SERPL-MCNC: 0.77 MG/DL (ref 0.5–1)
EOSINOPHIL # BLD: 0.3 TH/MM3 (ref 0–0.4)
EOSINOPHIL NFR BLD: 5.6 % (ref 0–4)
ERYTHROCYTE [DISTWIDTH] IN BLOOD BY AUTOMATED COUNT: 13.6 % (ref 11.6–17.2)
GFR SERPLBLD BASED ON 1.73 SQ M-ARVRAT: 93 ML/MIN (ref 89–?)
GLUCOSE SERPL-MCNC: 132 MG/DL (ref 74–106)
GLUCOSE UR STRIP-MCNC: (no result) MG/DL
HCO3 BLD-SCNC: 27.5 MEQ/L (ref 21–32)
HCT VFR BLD CALC: 36.2 % (ref 35–46)
HGB BLD-MCNC: 12.1 GM/DL (ref 11.6–15.3)
HGB UR QL STRIP: (no result)
KETONES UR STRIP-MCNC: (no result) MG/DL
LYMPHOCYTES # BLD AUTO: 1.6 TH/MM3 (ref 1–4.8)
LYMPHOCYTES NFR BLD AUTO: 27.8 % (ref 9–44)
MCH RBC QN AUTO: 27.9 PG (ref 27–34)
MCHC RBC AUTO-ENTMCNC: 33.4 % (ref 32–36)
MCV RBC AUTO: 83.6 FL (ref 80–100)
MONOCYTE #: 0.6 TH/MM3 (ref 0–0.9)
MONOCYTES NFR BLD: 10.3 % (ref 0–8)
MUCOUS THREADS #/AREA URNS LPF: (no result) /LPF
NEUTROPHILS # BLD AUTO: 3.3 TH/MM3 (ref 1.8–7.7)
NEUTROPHILS NFR BLD AUTO: 55.6 % (ref 16–70)
NITRITE UR QL STRIP: (no result)
PLATELET # BLD: 233 TH/MM3 (ref 150–450)
PMV BLD AUTO: 8.8 FL (ref 7–11)
PROT SERPL-MCNC: 7.3 GM/DL (ref 6.4–8.2)
RBC # BLD AUTO: 4.33 MIL/MM3 (ref 4–5.3)
SODIUM SERPL-SCNC: 144 MEQ/L (ref 136–145)
SP GR UR STRIP: 1.02 (ref 1–1.03)
SQUAMOUS #/AREA URNS HPF: 1 /HPF (ref 0–5)
TROPONIN I SERPL-MCNC: (no result) NG/ML (ref 0.02–0.05)
URINE LEUKOCYTE ESTERASE: (no result)
WBC # BLD AUTO: 5.9 TH/MM3 (ref 4–11)

## 2018-04-14 PROCEDURE — 85025 COMPLETE CBC W/AUTO DIFF WBC: CPT

## 2018-04-14 PROCEDURE — 96374 THER/PROPH/DIAG INJ IV PUSH: CPT

## 2018-04-14 PROCEDURE — 80053 COMPREHEN METABOLIC PANEL: CPT

## 2018-04-14 PROCEDURE — 84484 ASSAY OF TROPONIN QUANT: CPT

## 2018-04-14 PROCEDURE — 81001 URINALYSIS AUTO W/SCOPE: CPT

## 2018-04-14 PROCEDURE — 99284 EMERGENCY DEPT VISIT MOD MDM: CPT

## 2018-04-14 PROCEDURE — 96375 TX/PRO/DX INJ NEW DRUG ADDON: CPT

## 2018-04-14 PROCEDURE — 83690 ASSAY OF LIPASE: CPT

## 2018-04-14 PROCEDURE — 93005 ELECTROCARDIOGRAM TRACING: CPT

## 2018-04-14 NOTE — PD
HPI


Chief Complaint:  Abdominal Pain


Time Seen by Provider:  00:25


Travel History


International Travel<30 days:  No


Contact w/Intl Traveler<30days:  No


Traveled to known affect area:  No





History of Present Illness


HPI


MALT lymphoma with Helicobacter infection.   She underwent CT scan of the 

abdomen and


pelvis which showed thickening of the gastroesophageal junction.  She underwent 

upper EGD on


07/13/2017; she was found to have duodenitis.  There were multiple superficial 

ulcerations and


severe gastritis in the antrum.  There was also an ulcer which was suspicious 

for malignancy in the


lesser curvature.  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  Now pt is here  with  

lower back pain  which g=has  been  recurrent and  was in  ER    few months ago 

for  same  complaint    now   lower back pain and  bilateral  sciatic  like  

radiation





PFSH


Past Medical History


Arthritis:  Yes


Asthma:  No


Autoimmune Disease:  No


Blood Disorders:  No


Anxiety:  No


Depression:  No


Heart Rhythm Problems:  No


Cancer:  Yes (MULT LYMPHOMA )


Cardiovascular Problems:  No


High Cholesterol:  No


Chemotherapy:  No


Chest Pain:  No


Congestive Heart Failure:  No


COPD:  No


Cerebrovascular Accident:  No


Diabetes:  No


Diminished Hearing:  No


Endocrine:  No


Gastrointestinal Disorders:  Yes (LYPHOMA, GERD, TROUBLE SWALLOWING)


GERD:  No


Glaucoma:  No


Genitourinary:  No


Headaches:  No


Hepatitis:  No


Hiatal Hernia:  No


Hypertension:  No


Immune Disorder:  No


Kidney Stones:  No


Musculoskeletal:  No


Neurologic:  No


Psychiatric:  No


Reproductive:  No


Respiratory:  No


Myocardial Infarction:  No


Radiation Therapy:  No


Renal Failure:  No


Seizures:  No


Sickle Cell Disease:  No


Sleep Apnea:  No


Thyroid Disease:  Yes (REPORTS HAVING AN OVERACTIVE THYROID.)


Ulcer:  No


Pregnant?:  Not Pregnant


Menopausal:  Yes





Past Surgical History


Abdominal Surgery:  No


AICD:  No


Cardiac Surgery:  No


Ear Surgery:  No


Endocrine Surgery:  No


Eye Surgery:  No


Genitourinary Surgery:  No


Gynecologic Surgery:  Yes (LEFT OOPHRECTOMY)


Hysterectomy:  Yes (STATES ONE OVARY REMOVED ONLY)


Joint Replacement:  No


Neurologic Surgery:  No


Oral Surgery:  No


Pacemaker:  No


Thoracic Surgery:  No


Other Surgery:  Yes (SPINAL TAP TO REMOVE CYST, THYROID SURGERY, REMOVAL  OF 1 

OVARY)





Social History


Alcohol Use:  No


Tobacco Use:  No


Substance Use:  Yes (REPORTS QUIT USING COCAINE IN 1994)





Allergies-Medications


(Allergen,Severity, Reaction):  


Coded Allergies:  


     aspirin (Verified  Allergy, Severe, NAUSEA, 4/15/18)


     diclofenac (Verified  Allergy, Severe, VOMITING, 4/15/18)


     penicillin G (Verified  Adverse Reaction, Severe, VOMITING, 4/15/18)


Reported Meds & Prescriptions





Reported Meds & Active Scripts


Active


Flexeril (Cyclobenzaprine HCl) 10 Mg Tab 10 Mg PO TID 5 Days


Norco (Hydrocodone-Acetaminophen) 5 Mg-325 Mg Tab 1 Tab PO Q6H PRN


Gabapentin 300 Mg Cap 300 Mg PO BID


Pantoprazole (Pantoprazole Sodium) 40 Mg Tab 40 Mg PO DAILY


Reported


D3 Maximum Strength (Cholecalciferol) 5,000 Unit Cap 5,000 Units PO DAILY








Review of Systems


Except as stated in HPI:  all other systems reviewed are Neg





Physical Exam


Narrative


GENERAL: non toxic appearing  no signs of sepsis


SKIN: Warm and dry.


HEAD: Atraumatic. Normocephalic. 


EYES: Pupils equal and round. No scleral icterus. No injection or drainage. 


ENT: No nasal bleeding or discharge.  Mucous membranes pink and moist.


NECK: Trachea midline. No JVD. 


CARDIOVASCULAR: Regular rate and rhythm.  


RESPIRATORY: No accessory muscle use. Clear to auscultation. Breath sounds 

equal bilaterally. 


GASTROINTESTINAL: Abdomen  distended  tenderness to suprapubic   mild  and  


 LOWER BACK  left  lumbral  tender to  gluteal areas. 


MUSCULOSKELETAL: Extremities without clubbing, cyanosis, or edema. No obvious 

deformities. 


NEUROLOGICAL: Awake and alert. No obvious cranial nerve deficits.  Motor 

grossly within normal limits. Five out of 5 muscle strength in the arms and 

legs.  Normal speech.


PSYCHIATRIC: Appropriate mood and affect; insight and judgment normal.





Data


Data


Last Documented VS





Vital Signs








  Date Time  Temp Pulse Resp B/P (MAP) Pulse Ox O2 Delivery O2 Flow Rate FiO2


 


4/14/18 03:09  70 16 136/65 (88) 99 Room Air  


 


4/14/18 00:09 97.9       








Orders





 Orders


Ketorolac Inj (Toradol Inj) (4/14/18 01:00)


Morphine Inj (Morphine Inj) (4/14/18 01:00)


Ondansetron Inj (Zofran Inj) (4/14/18 01:00)


Complete Blood Count With Diff (4/14/18 00:57)


Comprehensive Metabolic Panel (4/14/18 00:57)


Troponin I (4/14/18 00:57)


Lipase (4/14/18 00:57)


Urinalysis - C+S If Indicated (4/14/18 00:57)


Ed Discharge Order (4/14/18 02:46)


Electrocardiogram (4/14/18 00:29)





Labs





Laboratory Tests








Test


  4/14/18


01:00 4/14/18


01:05


 


White Blood Count 5.9 TH/MM3  


 


Red Blood Count 4.33 MIL/MM3  


 


Hemoglobin 12.1 GM/DL  


 


Hematocrit 36.2 %  


 


Mean Corpuscular Volume 83.6 FL  


 


Mean Corpuscular Hemoglobin 27.9 PG  


 


Mean Corpuscular Hemoglobin


Concent 33.4 % 


  


 


 


Red Cell Distribution Width 13.6 %  


 


Platelet Count 233 TH/MM3  


 


Mean Platelet Volume 8.8 FL  


 


Neutrophils (%) (Auto) 55.6 %  


 


Lymphocytes (%) (Auto) 27.8 %  


 


Monocytes (%) (Auto) 10.3 %  


 


Eosinophils (%) (Auto) 5.6 %  


 


Basophils (%) (Auto) 0.7 %  


 


Neutrophils # (Auto) 3.3 TH/MM3  


 


Lymphocytes # (Auto) 1.6 TH/MM3  


 


Monocytes # (Auto) 0.6 TH/MM3  


 


Eosinophils # (Auto) 0.3 TH/MM3  


 


Basophils # (Auto) 0.0 TH/MM3  


 


CBC Comment DIFF FINAL  


 


Differential Comment   


 


Blood Urea Nitrogen 11 MG/DL  


 


Creatinine 0.77 MG/DL  


 


Random Glucose 132 MG/DL  


 


Total Protein 7.3 GM/DL  


 


Albumin 3.4 GM/DL  


 


Calcium Level 8.8 MG/DL  


 


Alkaline Phosphatase 110 U/L  


 


Aspartate Amino Transf


(AST/SGOT) 24 U/L 


  


 


 


Alanine Aminotransferase


(ALT/SGPT) 32 U/L 


  


 


 


Total Bilirubin 0.2 MG/DL  


 


Sodium Level 144 MEQ/L  


 


Potassium Level 3.5 MEQ/L  


 


Chloride Level 110 MEQ/L  


 


Carbon Dioxide Level 27.5 MEQ/L  


 


Anion Gap 7 MEQ/L  


 


Estimat Glomerular Filtration


Rate 93 ML/MIN 


  


 


 


Troponin I


  LESS THAN 0.02


NG/ML 


 


 


Lipase 144 U/L  


 


Urine Color  YELLOW 


 


Urine Turbidity  CLEAR 


 


Urine pH  5.5 


 


Urine Specific Gravity  1.023 


 


Urine Protein  NEG mg/dL 


 


Urine Glucose (UA)  NEG mg/dL 


 


Urine Ketones  NEG mg/dL 


 


Urine Occult Blood  NEG 


 


Urine Nitrite  NEG 


 


Urine Bilirubin  NEG 


 


Urine Urobilinogen


  


  LESS THAN 2.0


MG/DL


 


Urine Leukocyte Esterase  TRACE 


 


Urine RBC  1 /hpf 


 


Urine WBC  2 /hpf 


 


Urine Squamous Epithelial


Cells 


  1 /hpf 


 


 


Urine Mucus  FEW /lpf 


 


Microscopic Urinalysis Comment


  


  CULT NOT


INDICATED











MDM


Medical Decision Making


Medical Screen Exam Complete:  Yes


Emergency Medical Condition:  Yes


Differential Diagnosis


muscle  strain  vs  disc  disease  vs   radiculopathy vs  pain of  lymphoma  

other


Narrative Course


Toradol  and  morphine  pt  feels  much better  and  I feel  no  need for  

further  work up  at this  time  pt  has  [facility]  MD   connected to  [facility]  and

  close  follow up  recommended ..Pt feels comfortable going home  for outpt 

treatment .





Diagnosis





 Primary Impression:  


 Back pain


 Qualified Codes:  M54.42 - Lumbago with sciatica, left side


Patient Instructions:  Acute Low Back Pain (ED), General Instructions


Disposition:  01 DISCHARGE HOME


Condition:  Good











Bud Morataya MD Apr 14, 2018 01:08

## 2018-04-14 NOTE — EKG
Date Performed: 04/14/2018       Time Performed: 00:29:16

 

PTAGE:      62 years

 

EKG:      Sinus rhythm 

 

 MODERATE T-WAVE ABNORMALITY, CONSIDER ANTEROLATERAL ISCHEMIA ABNORMAL ECG Since the 

 

 PREVIOUS TRACING            , no significant change noted  PREVIOUS TRACING 07/28/17 @ 16.15

 

DOCTOR:   Caroline Owen  Interpretating Date/Time  04/14/2018 16:57:41

## 2018-04-15 ENCOUNTER — HOSPITAL ENCOUNTER (EMERGENCY)
Dept: HOSPITAL 17 - NEPC | Age: 63
Discharge: HOME | End: 2018-04-15
Payer: COMMERCIAL

## 2018-04-15 VITALS
TEMPERATURE: 98.3 F | SYSTOLIC BLOOD PRESSURE: 184 MMHG | RESPIRATION RATE: 18 BRPM | DIASTOLIC BLOOD PRESSURE: 81 MMHG | HEART RATE: 70 BPM | OXYGEN SATURATION: 98 %

## 2018-04-15 VITALS
RESPIRATION RATE: 19 BRPM | OXYGEN SATURATION: 98 % | SYSTOLIC BLOOD PRESSURE: 176 MMHG | DIASTOLIC BLOOD PRESSURE: 79 MMHG | HEART RATE: 75 BPM

## 2018-04-15 VITALS — WEIGHT: 165.35 LBS | HEIGHT: 67 IN | BODY MASS INDEX: 25.95 KG/M2

## 2018-04-15 DIAGNOSIS — M54.5: Primary | ICD-10-CM

## 2018-04-15 DIAGNOSIS — E05.90: ICD-10-CM

## 2018-04-15 DIAGNOSIS — C85.88: ICD-10-CM

## 2018-04-15 DIAGNOSIS — M51.36: ICD-10-CM

## 2018-04-15 DIAGNOSIS — K21.9: ICD-10-CM

## 2018-04-15 DIAGNOSIS — M19.90: ICD-10-CM

## 2018-04-15 PROCEDURE — 72131 CT LUMBAR SPINE W/O DYE: CPT

## 2018-04-15 PROCEDURE — 96374 THER/PROPH/DIAG INJ IV PUSH: CPT

## 2018-04-15 PROCEDURE — 99284 EMERGENCY DEPT VISIT MOD MDM: CPT

## 2018-04-15 PROCEDURE — 96376 TX/PRO/DX INJ SAME DRUG ADON: CPT

## 2018-04-15 PROCEDURE — 96375 TX/PRO/DX INJ NEW DRUG ADDON: CPT

## 2018-04-15 NOTE — PD
HPI


Chief Complaint:  Pain: Acute or Chronic


Time Seen by Provider:  09:27


Travel History


International Travel<30 days:  No


Contact w/Intl Traveler<30days:  No


Traveled to known affect area:  No





History of Present Illness


HPI


The patient is a 62-year-old -American female who presents to emergency 

department for low back pain.  The patient states she went to bed Thursday 

night feeling well, awakened Friday morning with low back pain.  The patient 

cannot recall an initial injury to the affected area, thought maybe she slept 

wrong and developed back pain.  However, the back pain progressed since she was 

seen in the emergency department on Friday night, states she had blood work and 

a UA that was unremarkable and was discharged home with pain medications.  The 

patient states the pain is worse when she wakes up in the morning, has been 

present for the last several days, and is located in the low back.  The pain 

radiates across the low back around the pelvis and occasionally down the lower 

extremities, but never farther than the knees.  She does have a history of 

previous back surgery.  She also has a history of lymphoma, recently underwent 

chemotherapy and is scheduled to undergo CT to evaluate for possible radiation 

therapy.  She is followed by her oncologist, Dr. Monroe.  She does not have a 

local primary physician.  She denies any fever, chills, sweats, urinary 

incontinence, frequency, urgency, or dysuria.





PFSH


Past Medical History


Arthritis:  Yes


Asthma:  No


Autoimmune Disease:  No


Blood Disorders:  No


Anxiety:  No


Depression:  No


Heart Rhythm Problems:  No


Cancer:  Yes (MULT LYMPHOMA )


Cardiovascular Problems:  No


High Cholesterol:  No


Chemotherapy:  Yes (3/22/18)


Chest Pain:  No


Congestive Heart Failure:  No


COPD:  No


Cerebrovascular Accident:  No


Diabetes:  No


Diminished Hearing:  No


Endocrine:  No


Gastrointestinal Disorders:  Yes (LYPHOMA, GERD, TROUBLE SWALLOWING)


GERD:  No


Glaucoma:  No


Genitourinary:  No


Headaches:  No


Hepatitis:  No


Hiatal Hernia:  No


Hypertension:  No


Immune Disorder:  No


Kidney Stones:  No


Musculoskeletal:  No


Neurologic:  No


Psychiatric:  No


Reproductive:  No


Respiratory:  No


Myocardial Infarction:  No


Radiation Therapy:  No


Renal Failure:  No


Seizures:  No


Sickle Cell Disease:  No


Sleep Apnea:  No


Thyroid Disease:  Yes (REPORTS HAVING AN OVERACTIVE THYROID.)


Ulcer:  No


Influenza Vaccination:  No


Menopausal:  Yes





Past Surgical History


Abdominal Surgery:  No


AICD:  No


Cardiac Surgery:  No


Ear Surgery:  No


Endocrine Surgery:  No


Eye Surgery:  No


Genitourinary Surgery:  No


Gynecologic Surgery:  Yes (LEFT OOPHRECTOMY)


Hysterectomy:  Yes (STATES ONE OVARY REMOVED ONLY)


Joint Replacement:  No


Neurologic Surgery:  No


Oral Surgery:  No


Pacemaker:  No


Thoracic Surgery:  No


Other Surgery:  Yes (SPINAL TAP TO REMOVE CYST, THYROID SURGERY,)





Social History


Alcohol Use:  No


Tobacco Use:  No


Substance Use:  No (REPORTS QUIT USING COCAINE IN 1994)





Allergies-Medications


(Allergen,Severity, Reaction):  


Coded Allergies:  


     aspirin (Verified  Allergy, Severe, NAUSEA, 4/15/18)


     diclofenac (Verified  Allergy, Severe, VOMITING, 4/15/18)


     penicillin G (Verified  Adverse Reaction, Severe, VOMITING, 4/15/18)


Reported Meds & Prescriptions





Reported Meds & Active Scripts


Active


Gabapentin 300 Mg Cap 300 Mg PO BID


Pantoprazole (Pantoprazole Sodium) 40 Mg Tab 40 Mg PO DAILY


Reported


D3 Maximum Strength (Cholecalciferol) 5,000 Unit Cap 5,000 Units PO DAILY








Review of Systems


Except as stated in HPI:  all other systems reviewed are Neg


General / Constitutional:  No: Fever


Cardiovascular:  No: Chest Pain or Discomfort


Respiratory:  No: Shortness of Breath


Gastrointestinal:  No: Nausea, Vomiting, Abdominal Pain


Genitourinary:  No: Urgency, Frequency, Dysuria, Incontinence


Musculoskeletal:  Positive: Pain, No: Weakness


Skin:  No Rash


Neurologic:  No: Paresthesia, Sensory Disturbance





Physical Exam


Narrative


GENERAL: Awake, alert, pleasant 8-year-old female who appears her stated age 

and is in no acute respiratory distress.


SKIN: Focused skin assessment warm/dry.


HEAD: Atraumatic. Normocephalic. 


EYES: No injection or drainage per


NECK: Trachea midline. No JVD. 


CARDIOVASCULAR: Regular rate and rhythm.  No murmur appreciated.


RESPIRATORY: No accessory muscle use. Clear to auscultation. Breath sounds 

equal bilaterally. 


GASTROINTESTINAL: Abdomen soft, non-tender, nondistended. 


Back: No tenderness over the thoracic vertebrae.  Mild tenderness of the 

trapezius bilateral.  Mild tenderness over the inferior lumbar spine.  Well-

healed scar noted.  Normal tenderness of the sacroiliac bilateral.  No obvious 

deformity.


MUSCULOSKELETAL: Flexion of the great toes bilateral is 5 out of 5.  

Plantarflexion is 5 out of 5.  Dorsiflexion is 5 out of 5.  Extension of the 

knees bilateral 5 out of 5.  Flexion of the hips bilaterally is 5 out of 5.  

Positive dorsalis pedal pulses.


NEUROLOGICAL: Awake and alert. No obvious cranial nerve deficits.  Motor 

grossly within normal limits. Normal speech.


PSYCHIATRIC: Appropriate mood and affect; insight and judgment normal.





Data


Data


Last Documented VS





Vital Signs








  Date Time  Temp Pulse Resp B/P (MAP) Pulse Ox O2 Delivery O2 Flow Rate FiO2


 


4/15/18 09:28  75 19 176/79 (111) 98 Room Air  


 


4/15/18 09:14 98.3       








Orders





 Orders


Morphine Inj (Morphine Inj) (4/15/18 09:45)


Ondansetron Inj (Zofran Inj) (4/15/18 09:45)


Ct Lumb Spine W/O Contrast (4/15/18 )








MDM


Medical Decision Making


Medical Screen Exam Complete:  Yes


Emergency Medical Condition:  Yes


Medical Record Reviewed:  Yes


Interpretation(s)





Last Impressions








Lumbar Spine CT 4/15/18 0000 Signed





Impressions: 





 Service Date/Time:  [place], April 15, 2018 11:41 - CONCLUSION: There is 





 significant degenerative facet disease worse at the L3-4 level suggesting 

motion 





 at this level.   Mild disc disease is scattered throughout the remainder of 

the 





 spine.   I don't see significant acute disc herniation.   Marky Raymodn MD  





 [facility]








Differential Diagnosis


Differential diagnosis includes musculoskeletal pain, lumbar ago, spinal 

stenosis, herniated disc, spondylolisthesis, metastatic disease, epidural 

abscess.


Narrative Course


IV was established and the patient was administered morphine and Zofran.  

Noncontrast CT lumbar spine was ordered to evaluate for possible metastatic 

disease.  I reviewed the patient's EMR, laboratory evaluation performed on 

Friday was unremarkable, UA was negative.  The patient does have multiple 

visits for back pain in the past, however, they have been spread out since 2006

, no chronic and habitual evaluations for back pain.  CT reveals degenerative 

disc disease with facet arthropathy, no acute fractures.  No mention of 

metastasis.  The patient was reevaluated at 12:35 PM.  The patient's pain had 

somewhat improved, however, was still present.  Therefore, a second dose of 

morphine was administered.  The patient be discharged home on pain medication 

and muscle relaxers.  She is advised to follow-up with a primary physician.





Diagnosis





 Primary Impression:  


 Back pain


 Qualified Codes:  M54.5 - Low back pain


Referrals:  


[facility]


as needed


Patient Instructions:  General Instructions





***Additional Instructions:  


Medications as directed.  Follow-up with your oncologist.  Follow-up with a 

primary physician.  Apply heat to the affected area.  Activity as tolerated.


***Med/Other Pt SpecificInfo:  Prescription(s) given


Scripts


Cyclobenzaprine (Flexeril) 10 Mg Tab


10 MG PO TID for Muscle Spasm for 5 Days, #15 TAB 0 Refills


   Prov: Geovanny Sandoval MD         4/15/18 


Hydrocodone-Acetaminophen (Norco) 5 Mg-325 Mg Tab


1 TAB PO Q6H Y for PAIN, #12 TAB 0 Refills


   Prov: Geovanny Sandoval MD         4/15/18


Disposition:  01 DISCHARGE HOME


Condition:  Stable











Geovanny Sandoval MD Apr 15, 2018 09:45

## 2018-04-15 NOTE — RADRPT
EXAM DATE/TIME:  04/15/2018 11:41 

 

[facility] COMPARISON:     No previous studies available for comparison.

 

INDICATIONS :     Woke up friday with back pain, waist level.

                      

RADIATION DOSE:     35.86 CTDIvol (mGy) 

 

 

MEDICAL HISTORY :     Lymphoma. Gastroesophageal reflux disease. Hyperthyroidism.

SURGICAL HISTORY :      Hysterectomy. Left ovary

ENCOUNTER:      Initial

ACUITY:      2 days

PAIN SCALE:      8/10

LOCATION:       Bilateral  low back

 

TECHNIQUE:     Volumetric scanning of the lumbar spine was performed.  Multiplanar reconstructions in
 the sagittal, coronal and oblique axial planes were performed.  Using automated exposure control and
 adjustment of the mA and/or kV according to patient size, radiation dose was kept as low as reasonab
ly achievable to obtain optimal diagnostic quality images.   DICOM format image data is available jaime
ctronically for review and comparison.  

 

FINDINGS:       

VERTEBRAE:     Normal vertebral body height.

ALIGNMENT:     No evidence of subluxation.

 

 

T12-L1: Mild bulging is evident without significant spinal stenosis

 

L1-L2:   Mild bulging present no significant spinal stenosis no hematocrit.

 

L2-L3:    The thecal sac has a normal diameter.  No evidence of disc bulge or protrusion.  The neural
 foramina are patent bilaterally.

 

L3-L4: Moderate facet degenerative changes mild disc bulging and bilateral neural foramina encroachme
nt.  Lateral recesses are stenotic.

 

L4-L5: Generalized bulging present mild facet disease and bilateral neural foraminal encroachment

 

L5-S1: Mild generalized disc bulging and moderate bilateral neural foramina encroachment.  Degenerati
ve changes both SI joints.  Reviewed effe significant degenerative facet disease worse at the L3-4 le
anuradha suggesting motion at this level.  Mild disc disease is scattered throughout the remainder of the 
spine.  I don't see significant acute disc herniation.

 

CONCLUSION:     There is significant degenerative facet disease worse at the L3-4 level suggesting mo
tion at this level.  

Mild disc disease is scattered throughout the remainder of the spine.  

I don't see significant acute disc herniation.

 

 Marky Raymond MD FACR on April 15, 2018 at 12:07           

Board Certified Radiologist.

 This report was verified electronically.

## 2018-05-18 ENCOUNTER — HOSPITAL ENCOUNTER (OUTPATIENT)
Dept: HOSPITAL 17 - HSDC | Age: 63
End: 2018-05-18
Attending: INTERNAL MEDICINE
Payer: COMMERCIAL

## 2018-05-18 VITALS
DIASTOLIC BLOOD PRESSURE: 84 MMHG | RESPIRATION RATE: 18 BRPM | HEART RATE: 78 BPM | SYSTOLIC BLOOD PRESSURE: 168 MMHG | OXYGEN SATURATION: 100 % | TEMPERATURE: 98.2 F

## 2018-05-18 VITALS — WEIGHT: 167.33 LBS | BODY MASS INDEX: 26.26 KG/M2 | HEIGHT: 67 IN

## 2018-05-18 DIAGNOSIS — K21.0: Primary | ICD-10-CM

## 2018-05-18 DIAGNOSIS — C88.4: ICD-10-CM

## 2018-05-18 DIAGNOSIS — K29.00: ICD-10-CM

## 2018-05-18 DIAGNOSIS — B96.81: ICD-10-CM

## 2018-05-18 PROCEDURE — 88342 IMHCHEM/IMCYTCHM 1ST ANTB: CPT

## 2018-05-18 PROCEDURE — 00731 ANES UPR GI NDSC PX NOS: CPT

## 2018-05-18 PROCEDURE — 81261 IGH GENE REARRANGE AMP METH: CPT

## 2018-05-18 PROCEDURE — 43239 EGD BIOPSY SINGLE/MULTIPLE: CPT

## 2018-05-18 PROCEDURE — 88341 IMHCHEM/IMCYTCHM EA ADD ANTB: CPT

## 2018-05-18 PROCEDURE — 88305 TISSUE EXAM BY PATHOLOGIST: CPT

## 2018-05-18 PROCEDURE — 88312 SPECIAL STAINS GROUP 1: CPT

## 2018-05-18 NOTE — GIPROC
[facility]

[address] [facility]. [facility], [address]

 

 

EGD PROCEDURE REPORT     EXAM DATE: 05/18/2018

 

PATIENT NAME:      Claire Garcia           MR #:      [id number]

YOB: 1955      VISIT #:     [id number]

ATTENDING:     Jihan Moore MD     ORDER #:     [id number]

ASSISTANT:      Missy Batista and Deborah Hurd     STATUS:     outpatient

 

 

INDICATIONS:  The patient is a 62 yr old female here for an EGD due to fu post

treatment MALT lymphoma, h.pylori infection

PROCEDURE PERFORMED:     EGD w/ biopsy

MEDICATIONS:     None and Per Anesthesia.

TOPICAL ANESTHETIC:     none

 

CONSENT: The patient understands the risks and benefits of the procedure and

understands that these risks include, but are not limited to: sedation,

allergic reaction, infection, perforation and/or bleeding. Alternative means of

evaluation and treatment include, among others: physical exam, x-rays, and/or

surgical intervention. The patient elects to proceed with this endoscopic

procedure.

 



medical equipment was checked for proper function. Hand hygiene and appropriate

measures for infection prevention was taken. After the risks, benefits and

alternatives of the procedure were thoroughly explained, Informed consent was

verified, confirmed and timeout was successfully executed by the treatment

team. The patient was anesthetized with topical anesthesia and the Pentax

[id number] endoscope was introduced through the mouth and advanced to the second

portion of the duodenum.  Retroflexed views revealed a hiatal hernia  The

gastroscope was then slowly withdrawn and removed.

Gastrtiis antrum, body-multiple biopsies from antrum/body/fundus-NBI scope used

duodenum normal-biopsy to r/o celiac disease from bulb and second portion

esophagitis distal esophagus -biopsy.

 

 

 

ADVERSE EVENTS:     There were no complications.

IMPRESSIONS:     1.  Gastrtiis antrum, body-multiple biopsies from

antrum/body/fundus-NBI scope used

duodenum normal-biopsy to r/o celiac disease from bulb and second portion

esophagitis distal esophagus -biopsy

2.  Retroflexed views revealed a hiatal hernia

 

RECOMMENDATIONS:     1.  Await biopsy results.  Biopsy results will not be ready

for 7-10 days.  If you don't hear from us in two weeks, call our office for

biopsy results.

2.  Anti-reflux regimen

3.  Continue PPI

4.  Avoid NSAIDS

PATIENT CONDITION:     stable

DISPOSITION:     Home

REPEAT EXAM:     Return 3 months EGD

 

 

___________________________________

Jihan Moore MD

eSigned:  Jihan Moore MD 05/18/2018 11:24 AM

 

 

cc: